# Patient Record
Sex: FEMALE | Race: WHITE | NOT HISPANIC OR LATINO | Employment: FULL TIME | URBAN - METROPOLITAN AREA
[De-identification: names, ages, dates, MRNs, and addresses within clinical notes are randomized per-mention and may not be internally consistent; named-entity substitution may affect disease eponyms.]

---

## 2017-07-27 ENCOUNTER — HOSPITAL ENCOUNTER (INPATIENT)
Facility: HOSPITAL | Age: 32
LOS: 2 days | Discharge: HOME | End: 2017-07-29
Attending: EMERGENCY MEDICINE | Admitting: INTERNAL MEDICINE

## 2017-07-27 ENCOUNTER — APPOINTMENT (OUTPATIENT)
Dept: CT IMAGING | Facility: HOSPITAL | Age: 32
End: 2017-07-27

## 2017-07-27 DIAGNOSIS — A41.9 SEVERE SEPSIS (HCC): ICD-10-CM

## 2017-07-27 DIAGNOSIS — R65.20 SEVERE SEPSIS (HCC): ICD-10-CM

## 2017-07-27 DIAGNOSIS — N12 PYELONEPHRITIS: Primary | ICD-10-CM

## 2017-07-27 PROBLEM — E87.20 LACTIC ACIDOSIS: Status: ACTIVE | Noted: 2017-07-27

## 2017-07-27 PROBLEM — N17.9 AKI (ACUTE KIDNEY INJURY) (HCC): Status: ACTIVE | Noted: 2017-07-27

## 2017-07-27 PROBLEM — N10 ACUTE PYELONEPHRITIS: Status: ACTIVE | Noted: 2017-07-27

## 2017-07-27 LAB
ALBUMIN SERPL-MCNC: 3.4 G/DL (ref 3.5–5.2)
ALBUMIN/GLOB SERPL: 0.8 G/DL
ALP SERPL-CCNC: 93 U/L (ref 39–117)
ALT SERPL W P-5'-P-CCNC: 23 U/L (ref 1–33)
ANION GAP SERPL CALCULATED.3IONS-SCNC: 17.5 MMOL/L
AST SERPL-CCNC: 17 U/L (ref 1–32)
BACTERIA UR QL AUTO: ABNORMAL /HPF
BASOPHILS # BLD AUTO: 0.04 10*3/MM3 (ref 0–0.2)
BASOPHILS NFR BLD AUTO: 0.3 % (ref 0–1.5)
BILIRUB SERPL-MCNC: 0.3 MG/DL (ref 0.1–1.2)
BILIRUB UR QL STRIP: NEGATIVE
BUN BLD-MCNC: 13 MG/DL (ref 6–20)
BUN/CREAT SERPL: 10.2 (ref 7–25)
CALCIUM SPEC-SCNC: 9.4 MG/DL (ref 8.6–10.5)
CHLORIDE SERPL-SCNC: 97 MMOL/L (ref 98–107)
CLARITY UR: ABNORMAL
CO2 SERPL-SCNC: 21.5 MMOL/L (ref 22–29)
COLOR UR: YELLOW
CREAT BLD-MCNC: 1.27 MG/DL (ref 0.57–1)
D-LACTATE SERPL-SCNC: 2.5 MMOL/L (ref 0.5–2)
DEPRECATED RDW RBC AUTO: 45.7 FL (ref 37–54)
EOSINOPHIL # BLD AUTO: 0.21 10*3/MM3 (ref 0–0.7)
EOSINOPHIL NFR BLD AUTO: 1.5 % (ref 0.3–6.2)
ERYTHROCYTE [DISTWIDTH] IN BLOOD BY AUTOMATED COUNT: 12.9 % (ref 11.7–13)
GFR SERPL CREATININE-BSD FRML MDRD: 49 ML/MIN/1.73
GLOBULIN UR ELPH-MCNC: 4.5 GM/DL
GLUCOSE BLD-MCNC: 125 MG/DL (ref 65–99)
GLUCOSE UR STRIP-MCNC: NEGATIVE MG/DL
HCG SERPL QL: NEGATIVE
HCT VFR BLD AUTO: 40 % (ref 35.6–45.5)
HGB BLD-MCNC: 13.4 G/DL (ref 11.9–15.5)
HGB UR QL STRIP.AUTO: ABNORMAL
HOLD SPECIMEN: NORMAL
HYALINE CASTS UR QL AUTO: ABNORMAL /LPF
IMM GRANULOCYTES # BLD: 0.05 10*3/MM3 (ref 0–0.03)
IMM GRANULOCYTES NFR BLD: 0.4 % (ref 0–0.5)
KETONES UR QL STRIP: ABNORMAL
LEUKOCYTE ESTERASE UR QL STRIP.AUTO: ABNORMAL
LYMPHOCYTES # BLD AUTO: 1.03 10*3/MM3 (ref 0.9–4.8)
LYMPHOCYTES NFR BLD AUTO: 7.4 % (ref 19.6–45.3)
MCH RBC QN AUTO: 32.6 PG (ref 26.9–32)
MCHC RBC AUTO-ENTMCNC: 33.5 G/DL (ref 32.4–36.3)
MCV RBC AUTO: 97.3 FL (ref 80.5–98.2)
MONOCYTES # BLD AUTO: 1.07 10*3/MM3 (ref 0.2–1.2)
MONOCYTES NFR BLD AUTO: 7.7 % (ref 5–12)
NEUTROPHILS # BLD AUTO: 11.48 10*3/MM3 (ref 1.9–8.1)
NEUTROPHILS NFR BLD AUTO: 82.7 % (ref 42.7–76)
NITRITE UR QL STRIP: POSITIVE
PH UR STRIP.AUTO: 6 [PH] (ref 5–8)
PLATELET # BLD AUTO: 332 10*3/MM3 (ref 140–500)
PMV BLD AUTO: 10.6 FL (ref 6–12)
POTASSIUM BLD-SCNC: 4.3 MMOL/L (ref 3.5–5.2)
PROT SERPL-MCNC: 7.9 G/DL (ref 6–8.5)
PROT UR QL STRIP: ABNORMAL
RBC # BLD AUTO: 4.11 10*6/MM3 (ref 3.9–5.2)
RBC # UR: ABNORMAL /HPF
REF LAB TEST METHOD: ABNORMAL
SODIUM BLD-SCNC: 136 MMOL/L (ref 136–145)
SP GR UR STRIP: 1.01 (ref 1–1.03)
SQUAMOUS #/AREA URNS HPF: ABNORMAL /HPF
UROBILINOGEN UR QL STRIP: ABNORMAL
WBC NRBC COR # BLD: 13.88 10*3/MM3 (ref 4.5–10.7)
WBC UR QL AUTO: ABNORMAL /HPF
WHOLE BLOOD HOLD SPECIMEN: NORMAL
WHOLE BLOOD HOLD SPECIMEN: NORMAL

## 2017-07-27 PROCEDURE — 84703 CHORIONIC GONADOTROPIN ASSAY: CPT

## 2017-07-27 PROCEDURE — 87040 BLOOD CULTURE FOR BACTERIA: CPT

## 2017-07-27 PROCEDURE — 80053 COMPREHEN METABOLIC PANEL: CPT

## 2017-07-27 PROCEDURE — 74177 CT ABD & PELVIS W/CONTRAST: CPT

## 2017-07-27 PROCEDURE — 87086 URINE CULTURE/COLONY COUNT: CPT | Performed by: EMERGENCY MEDICINE

## 2017-07-27 PROCEDURE — 87186 SC STD MICRODIL/AGAR DIL: CPT | Performed by: EMERGENCY MEDICINE

## 2017-07-27 PROCEDURE — 81001 URINALYSIS AUTO W/SCOPE: CPT | Performed by: EMERGENCY MEDICINE

## 2017-07-27 PROCEDURE — 85025 COMPLETE CBC W/AUTO DIFF WBC: CPT

## 2017-07-27 PROCEDURE — 87040 BLOOD CULTURE FOR BACTERIA: CPT | Performed by: EMERGENCY MEDICINE

## 2017-07-27 PROCEDURE — 25010000002 CEFEPIME: Performed by: EMERGENCY MEDICINE

## 2017-07-27 PROCEDURE — 83605 ASSAY OF LACTIC ACID: CPT | Performed by: EMERGENCY MEDICINE

## 2017-07-27 PROCEDURE — 99284 EMERGENCY DEPT VISIT MOD MDM: CPT

## 2017-07-27 PROCEDURE — 36415 COLL VENOUS BLD VENIPUNCTURE: CPT

## 2017-07-27 PROCEDURE — 0 IOPAMIDOL 61 % SOLUTION: Performed by: EMERGENCY MEDICINE

## 2017-07-27 RX ORDER — SODIUM CHLORIDE 9 MG/ML
75 INJECTION, SOLUTION INTRAVENOUS CONTINUOUS
Status: DISCONTINUED | OUTPATIENT
Start: 2017-07-27 | End: 2017-07-29 | Stop reason: HOSPADM

## 2017-07-27 RX ORDER — ACETAMINOPHEN 325 MG/1
975 TABLET ORAL ONCE
Status: DISCONTINUED | OUTPATIENT
Start: 2017-07-27 | End: 2017-07-29 | Stop reason: HOSPADM

## 2017-07-27 RX ORDER — SULFAMETHOXAZOLE AND TRIMETHOPRIM 800; 160 MG/1; MG/1
1 TABLET ORAL 2 TIMES DAILY
COMMUNITY
Start: 2017-07-24 | End: 2017-07-29 | Stop reason: HOSPADM

## 2017-07-27 RX ORDER — SODIUM CHLORIDE 0.9 % (FLUSH) 0.9 %
10 SYRINGE (ML) INJECTION AS NEEDED
Status: DISCONTINUED | OUTPATIENT
Start: 2017-07-27 | End: 2017-07-29 | Stop reason: HOSPADM

## 2017-07-27 RX ORDER — ONDANSETRON 2 MG/ML
4 INJECTION INTRAMUSCULAR; INTRAVENOUS EVERY 6 HOURS PRN
Status: DISCONTINUED | OUTPATIENT
Start: 2017-07-27 | End: 2017-07-29 | Stop reason: HOSPADM

## 2017-07-27 RX ORDER — CEFTRIAXONE SODIUM 1 G/50ML
1 INJECTION, SOLUTION INTRAVENOUS EVERY 24 HOURS
Status: DISCONTINUED | OUTPATIENT
Start: 2017-07-28 | End: 2017-07-28

## 2017-07-27 RX ORDER — ACETAMINOPHEN 325 MG/1
650 TABLET ORAL EVERY 4 HOURS PRN
Status: DISCONTINUED | OUTPATIENT
Start: 2017-07-27 | End: 2017-07-29 | Stop reason: HOSPADM

## 2017-07-27 RX ORDER — SODIUM CHLORIDE 0.9 % (FLUSH) 0.9 %
1-10 SYRINGE (ML) INJECTION AS NEEDED
Status: DISCONTINUED | OUTPATIENT
Start: 2017-07-27 | End: 2017-07-29 | Stop reason: HOSPADM

## 2017-07-27 RX ORDER — ACETAMINOPHEN 500 MG
1000 TABLET ORAL ONCE
Status: COMPLETED | OUTPATIENT
Start: 2017-07-27 | End: 2017-07-27

## 2017-07-27 RX ORDER — PAROXETINE HYDROCHLORIDE 40 MG/1
40 TABLET, FILM COATED ORAL EVERY MORNING
COMMUNITY
End: 2017-08-29 | Stop reason: HOSPADM

## 2017-07-27 RX ADMIN — SODIUM CHLORIDE 1770 ML: 9 INJECTION, SOLUTION INTRAVENOUS at 20:30

## 2017-07-27 RX ADMIN — IOPAMIDOL 85 ML: 612 INJECTION, SOLUTION INTRAVENOUS at 21:43

## 2017-07-27 RX ADMIN — ACETAMINOPHEN 1000 MG: 500 TABLET ORAL at 19:43

## 2017-07-27 RX ADMIN — CEFEPIME 2 G: 2 INJECTION, POWDER, FOR SOLUTION INTRAVENOUS at 20:49

## 2017-07-28 LAB
ANION GAP SERPL CALCULATED.3IONS-SCNC: 15.3 MMOL/L
BASOPHILS # BLD AUTO: 0.07 10*3/MM3 (ref 0–0.2)
BASOPHILS NFR BLD AUTO: 0.5 % (ref 0–1.5)
BUN BLD-MCNC: 11 MG/DL (ref 6–20)
BUN/CREAT SERPL: 9.2 (ref 7–25)
CALCIUM SPEC-SCNC: 7.8 MG/DL (ref 8.6–10.5)
CHLORIDE SERPL-SCNC: 105 MMOL/L (ref 98–107)
CO2 SERPL-SCNC: 15.7 MMOL/L (ref 22–29)
CREAT BLD-MCNC: 1.19 MG/DL (ref 0.57–1)
D-LACTATE SERPL-SCNC: 0.9 MMOL/L (ref 0.5–2)
DEPRECATED RDW RBC AUTO: 46 FL (ref 37–54)
EOSINOPHIL # BLD AUTO: 0.31 10*3/MM3 (ref 0–0.7)
EOSINOPHIL NFR BLD AUTO: 2.3 % (ref 0.3–6.2)
ERYTHROCYTE [DISTWIDTH] IN BLOOD BY AUTOMATED COUNT: 13.1 % (ref 11.7–13)
GFR SERPL CREATININE-BSD FRML MDRD: 53 ML/MIN/1.73
GLUCOSE BLD-MCNC: 109 MG/DL (ref 65–99)
HCT VFR BLD AUTO: 38.3 % (ref 35.6–45.5)
HGB BLD-MCNC: 12.2 G/DL (ref 11.9–15.5)
IMM GRANULOCYTES # BLD: 0.06 10*3/MM3 (ref 0–0.03)
IMM GRANULOCYTES NFR BLD: 0.5 % (ref 0–0.5)
LYMPHOCYTES # BLD AUTO: 1.67 10*3/MM3 (ref 0.9–4.8)
LYMPHOCYTES NFR BLD AUTO: 12.6 % (ref 19.6–45.3)
MCH RBC QN AUTO: 30.8 PG (ref 26.9–32)
MCHC RBC AUTO-ENTMCNC: 31.9 G/DL (ref 32.4–36.3)
MCV RBC AUTO: 96.7 FL (ref 80.5–98.2)
MONOCYTES # BLD AUTO: 1.7 10*3/MM3 (ref 0.2–1.2)
MONOCYTES NFR BLD AUTO: 12.8 % (ref 5–12)
NEUTROPHILS # BLD AUTO: 9.47 10*3/MM3 (ref 1.9–8.1)
NEUTROPHILS NFR BLD AUTO: 71.3 % (ref 42.7–76)
NRBC BLD MANUAL-RTO: 0 /100 WBC (ref 0–0)
PLATELET # BLD AUTO: 237 10*3/MM3 (ref 140–500)
PMV BLD AUTO: 11.1 FL (ref 6–12)
POTASSIUM BLD-SCNC: 4.4 MMOL/L (ref 3.5–5.2)
RBC # BLD AUTO: 3.96 10*6/MM3 (ref 3.9–5.2)
SODIUM BLD-SCNC: 136 MMOL/L (ref 136–145)
WBC NRBC COR # BLD: 13.28 10*3/MM3 (ref 4.5–10.7)

## 2017-07-28 PROCEDURE — 83605 ASSAY OF LACTIC ACID: CPT | Performed by: EMERGENCY MEDICINE

## 2017-07-28 PROCEDURE — 25010000002 ONDANSETRON PER 1 MG: Performed by: INTERNAL MEDICINE

## 2017-07-28 PROCEDURE — 25010000002 CEFTRIAXONE PER 250 MG: Performed by: INTERNAL MEDICINE

## 2017-07-28 PROCEDURE — 85025 COMPLETE CBC W/AUTO DIFF WBC: CPT | Performed by: INTERNAL MEDICINE

## 2017-07-28 PROCEDURE — 80048 BASIC METABOLIC PNL TOTAL CA: CPT | Performed by: INTERNAL MEDICINE

## 2017-07-28 RX ORDER — CEFTRIAXONE SODIUM 2 G/50ML
2 INJECTION, SOLUTION INTRAVENOUS EVERY 24 HOURS
Status: DISCONTINUED | OUTPATIENT
Start: 2017-07-29 | End: 2017-07-29 | Stop reason: HOSPADM

## 2017-07-28 RX ADMIN — ONDANSETRON 4 MG: 2 INJECTION INTRAMUSCULAR; INTRAVENOUS at 05:44

## 2017-07-28 RX ADMIN — ACETAMINOPHEN 650 MG: 325 TABLET ORAL at 19:44

## 2017-07-28 RX ADMIN — CEFTRIAXONE SODIUM 1 G: 1 INJECTION, SOLUTION INTRAVENOUS at 05:44

## 2017-07-28 RX ADMIN — ACETAMINOPHEN 650 MG: 325 TABLET ORAL at 07:25

## 2017-07-28 RX ADMIN — SODIUM CHLORIDE 125 ML/HR: 9 INJECTION, SOLUTION INTRAVENOUS at 00:16

## 2017-07-28 RX ADMIN — SODIUM CHLORIDE 125 ML/HR: 9 INJECTION, SOLUTION INTRAVENOUS at 08:36

## 2017-07-28 RX ADMIN — SODIUM CHLORIDE 125 ML/HR: 9 INJECTION, SOLUTION INTRAVENOUS at 16:50

## 2017-07-29 VITALS
HEART RATE: 86 BPM | WEIGHT: 130 LBS | BODY MASS INDEX: 21.66 KG/M2 | SYSTOLIC BLOOD PRESSURE: 113 MMHG | HEIGHT: 65 IN | TEMPERATURE: 98.9 F | DIASTOLIC BLOOD PRESSURE: 64 MMHG | OXYGEN SATURATION: 97 % | RESPIRATION RATE: 16 BRPM

## 2017-07-29 PROBLEM — A41.51 ESCHERICHIA COLI SEPSIS (HCC): Status: ACTIVE | Noted: 2017-07-29

## 2017-07-29 PROBLEM — N17.9 AKI (ACUTE KIDNEY INJURY) (HCC): Status: RESOLVED | Noted: 2017-07-27 | Resolved: 2017-07-29

## 2017-07-29 PROBLEM — A41.9 SEPSIS (HCC): Status: RESOLVED | Noted: 2017-07-27 | Resolved: 2017-07-29

## 2017-07-29 PROBLEM — E87.20 LACTIC ACIDOSIS: Status: RESOLVED | Noted: 2017-07-27 | Resolved: 2017-07-29

## 2017-07-29 LAB
ANION GAP SERPL CALCULATED.3IONS-SCNC: 10 MMOL/L
BACTERIA SPEC AEROBE CULT: ABNORMAL
BUN BLD-MCNC: 6 MG/DL (ref 6–20)
BUN/CREAT SERPL: 6.7 (ref 7–25)
CALCIUM SPEC-SCNC: 8.4 MG/DL (ref 8.6–10.5)
CHLORIDE SERPL-SCNC: 111 MMOL/L (ref 98–107)
CO2 SERPL-SCNC: 21 MMOL/L (ref 22–29)
CREAT BLD-MCNC: 0.9 MG/DL (ref 0.57–1)
GFR SERPL CREATININE-BSD FRML MDRD: 74 ML/MIN/1.73
GLUCOSE BLD-MCNC: 103 MG/DL (ref 65–99)
HBA1C MFR BLD: 5.2 % (ref 4.8–5.6)
POTASSIUM BLD-SCNC: 4.2 MMOL/L (ref 3.5–5.2)
SODIUM BLD-SCNC: 142 MMOL/L (ref 136–145)

## 2017-07-29 PROCEDURE — 25010000003 CEFTRIAXONE PER 250 MG: Performed by: INTERNAL MEDICINE

## 2017-07-29 PROCEDURE — 83036 HEMOGLOBIN GLYCOSYLATED A1C: CPT | Performed by: INTERNAL MEDICINE

## 2017-07-29 PROCEDURE — 80048 BASIC METABOLIC PNL TOTAL CA: CPT | Performed by: INTERNAL MEDICINE

## 2017-07-29 RX ORDER — ACETAMINOPHEN 325 MG/1
650 TABLET ORAL EVERY 6 HOURS PRN
Refills: 0
Start: 2017-07-29 | End: 2017-08-29 | Stop reason: HOSPADM

## 2017-07-29 RX ORDER — CEPHALEXIN 500 MG/1
500 CAPSULE ORAL 3 TIMES DAILY
Qty: 33 CAPSULE | Refills: 0 | Status: ON HOLD | OUTPATIENT
Start: 2017-07-30 | End: 2017-08-18

## 2017-07-29 RX ADMIN — SODIUM CHLORIDE 75 ML/HR: 9 INJECTION, SOLUTION INTRAVENOUS at 04:05

## 2017-07-29 RX ADMIN — CEFTRIAXONE SODIUM 2 G: 2 INJECTION, SOLUTION INTRAVENOUS at 05:23

## 2017-08-01 LAB
BACTERIA SPEC AEROBE CULT: NORMAL
BACTERIA SPEC AEROBE CULT: NORMAL

## 2017-08-17 ENCOUNTER — HOSPITAL ENCOUNTER (EMERGENCY)
Facility: HOSPITAL | Age: 32
Discharge: PSYCHIATRIC OR SUBSTANCE USE FACILITY | End: 2017-08-17
Attending: EMERGENCY MEDICINE | Admitting: EMERGENCY MEDICINE

## 2017-08-17 ENCOUNTER — HOSPITAL ENCOUNTER (INPATIENT)
Facility: HOSPITAL | Age: 32
LOS: 12 days | Discharge: HOME | End: 2017-08-29
Attending: SPECIALIST | Admitting: SPECIALIST

## 2017-08-17 VITALS
TEMPERATURE: 98.7 F | RESPIRATION RATE: 16 BRPM | OXYGEN SATURATION: 100 % | DIASTOLIC BLOOD PRESSURE: 99 MMHG | SYSTOLIC BLOOD PRESSURE: 137 MMHG | WEIGHT: 125 LBS | HEIGHT: 66 IN | HEART RATE: 79 BPM | BODY MASS INDEX: 20.09 KG/M2

## 2017-08-17 DIAGNOSIS — F30.9 MANIA (HCC): Primary | ICD-10-CM

## 2017-08-17 PROBLEM — F31.64 BIPOLAR DISORDER, CURR EPISODE MIXED, SEVERE, WITH PSYCHOTIC FEATURES: Status: ACTIVE | Noted: 2017-08-17

## 2017-08-17 LAB
ALBUMIN SERPL-MCNC: 4.8 G/DL (ref 3.5–5.2)
ALBUMIN/GLOB SERPL: 1.3 G/DL
ALP SERPL-CCNC: 62 U/L (ref 39–117)
ALT SERPL W P-5'-P-CCNC: 15 U/L (ref 1–33)
AMPHET+METHAMPHET UR QL: NEGATIVE
ANION GAP SERPL CALCULATED.3IONS-SCNC: 14.5 MMOL/L
ANION GAP SERPL CALCULATED.3IONS-SCNC: 15.9 MMOL/L
APAP SERPL-MCNC: <5 MCG/ML (ref 10–30)
AST SERPL-CCNC: 22 U/L (ref 1–32)
BACTERIA UR QL AUTO: ABNORMAL /HPF
BARBITURATES UR QL SCN: NEGATIVE
BASOPHILS # BLD AUTO: 0.14 10*3/MM3 (ref 0–0.2)
BASOPHILS NFR BLD AUTO: 1.7 % (ref 0–1.5)
BENZODIAZ UR QL SCN: NEGATIVE
BILIRUB SERPL-MCNC: 0.7 MG/DL (ref 0.1–1.2)
BILIRUB UR QL STRIP: NEGATIVE
BUN BLD-MCNC: 9 MG/DL (ref 6–20)
BUN BLD-MCNC: 9 MG/DL (ref 6–20)
BUN/CREAT SERPL: 10.1 (ref 7–25)
BUN/CREAT SERPL: 9.3 (ref 7–25)
CALCIUM SPEC-SCNC: 9.3 MG/DL (ref 8.6–10.5)
CALCIUM SPEC-SCNC: 9.6 MG/DL (ref 8.6–10.5)
CANNABINOIDS SERPL QL: POSITIVE
CHLORIDE SERPL-SCNC: 103 MMOL/L (ref 98–107)
CHLORIDE SERPL-SCNC: 103 MMOL/L (ref 98–107)
CLARITY UR: CLEAR
CO2 SERPL-SCNC: 21.5 MMOL/L (ref 22–29)
CO2 SERPL-SCNC: 22.1 MMOL/L (ref 22–29)
COCAINE UR QL: NEGATIVE
COLOR UR: YELLOW
CREAT BLD-MCNC: 0.89 MG/DL (ref 0.57–1)
CREAT BLD-MCNC: 0.97 MG/DL (ref 0.57–1)
DEPRECATED RDW RBC AUTO: 49.2 FL (ref 37–54)
EOSINOPHIL # BLD AUTO: 0.39 10*3/MM3 (ref 0–0.7)
EOSINOPHIL NFR BLD AUTO: 4.9 % (ref 0.3–6.2)
ERYTHROCYTE [DISTWIDTH] IN BLOOD BY AUTOMATED COUNT: 13.7 % (ref 11.7–13)
ETHANOL BLD-MCNC: <10 MG/DL (ref 0–10)
ETHANOL UR QL: <0.01 %
GFR SERPL CREATININE-BSD FRML MDRD: 67 ML/MIN/1.73
GFR SERPL CREATININE-BSD FRML MDRD: 74 ML/MIN/1.73
GLOBULIN UR ELPH-MCNC: 3.7 GM/DL
GLUCOSE BLD-MCNC: 89 MG/DL (ref 65–99)
GLUCOSE BLD-MCNC: 91 MG/DL (ref 65–99)
GLUCOSE UR STRIP-MCNC: NEGATIVE MG/DL
HCG SERPL QL: NEGATIVE
HCT VFR BLD AUTO: 41.1 % (ref 35.6–45.5)
HGB BLD-MCNC: 13.5 G/DL (ref 11.9–15.5)
HGB UR QL STRIP.AUTO: ABNORMAL
HYALINE CASTS UR QL AUTO: ABNORMAL /LPF
IMM GRANULOCYTES # BLD: 0 10*3/MM3 (ref 0–0.03)
IMM GRANULOCYTES NFR BLD: 0 % (ref 0–0.5)
KETONES UR QL STRIP: ABNORMAL
LEUKOCYTE ESTERASE UR QL STRIP.AUTO: NEGATIVE
LYMPHOCYTES # BLD AUTO: 2.19 10*3/MM3 (ref 0.9–4.8)
LYMPHOCYTES NFR BLD AUTO: 27.3 % (ref 19.6–45.3)
MCH RBC QN AUTO: 32.3 PG (ref 26.9–32)
MCHC RBC AUTO-ENTMCNC: 32.8 G/DL (ref 32.4–36.3)
MCV RBC AUTO: 98.3 FL (ref 80.5–98.2)
METHADONE UR QL SCN: NEGATIVE
MONOCYTES # BLD AUTO: 0.56 10*3/MM3 (ref 0.2–1.2)
MONOCYTES NFR BLD AUTO: 7 % (ref 5–12)
NEUTROPHILS # BLD AUTO: 4.74 10*3/MM3 (ref 1.9–8.1)
NEUTROPHILS NFR BLD AUTO: 59.1 % (ref 42.7–76)
NITRITE UR QL STRIP: NEGATIVE
OPIATES UR QL: NEGATIVE
OXYCODONE UR QL SCN: NEGATIVE
PH UR STRIP.AUTO: 6 [PH] (ref 5–8)
PLATELET # BLD AUTO: 331 10*3/MM3 (ref 140–500)
PMV BLD AUTO: 10.3 FL (ref 6–12)
POTASSIUM BLD-SCNC: 2.9 MMOL/L (ref 3.5–5.2)
POTASSIUM BLD-SCNC: 4.7 MMOL/L (ref 3.5–5.2)
PROT SERPL-MCNC: 8.5 G/DL (ref 6–8.5)
PROT UR QL STRIP: NEGATIVE
RBC # BLD AUTO: 4.18 10*6/MM3 (ref 3.9–5.2)
RBC # UR: ABNORMAL /HPF
REF LAB TEST METHOD: ABNORMAL
SALICYLATES SERPL-MCNC: <0.3 MG/DL
SODIUM BLD-SCNC: 139 MMOL/L (ref 136–145)
SODIUM BLD-SCNC: 141 MMOL/L (ref 136–145)
SP GR UR STRIP: 1.01 (ref 1–1.03)
SQUAMOUS #/AREA URNS HPF: ABNORMAL /HPF
T4 FREE SERPL-MCNC: 1.93 NG/DL (ref 0.93–1.7)
TSH SERPL DL<=0.05 MIU/L-ACNC: 2.44 MIU/ML (ref 0.27–4.2)
UROBILINOGEN UR QL STRIP: ABNORMAL
WBC NRBC COR # BLD: 8.02 10*3/MM3 (ref 4.5–10.7)
WBC UR QL AUTO: ABNORMAL /HPF

## 2017-08-17 PROCEDURE — 80307 DRUG TEST PRSMV CHEM ANLYZR: CPT | Performed by: EMERGENCY MEDICINE

## 2017-08-17 PROCEDURE — 84443 ASSAY THYROID STIM HORMONE: CPT | Performed by: SPECIALIST

## 2017-08-17 PROCEDURE — 80048 BASIC METABOLIC PNL TOTAL CA: CPT | Performed by: INTERNAL MEDICINE

## 2017-08-17 PROCEDURE — 36415 COLL VENOUS BLD VENIPUNCTURE: CPT

## 2017-08-17 PROCEDURE — 81003 URINALYSIS AUTO W/O SCOPE: CPT | Performed by: EMERGENCY MEDICINE

## 2017-08-17 PROCEDURE — 85025 COMPLETE CBC W/AUTO DIFF WBC: CPT | Performed by: EMERGENCY MEDICINE

## 2017-08-17 PROCEDURE — 81001 URINALYSIS AUTO W/SCOPE: CPT | Performed by: EMERGENCY MEDICINE

## 2017-08-17 PROCEDURE — 84703 CHORIONIC GONADOTROPIN ASSAY: CPT | Performed by: EMERGENCY MEDICINE

## 2017-08-17 PROCEDURE — 99284 EMERGENCY DEPT VISIT MOD MDM: CPT

## 2017-08-17 PROCEDURE — 84439 ASSAY OF FREE THYROXINE: CPT | Performed by: SPECIALIST

## 2017-08-17 PROCEDURE — 80053 COMPREHEN METABOLIC PANEL: CPT | Performed by: EMERGENCY MEDICINE

## 2017-08-17 RX ORDER — OLANZAPINE 7.5 MG/1
15 TABLET ORAL NIGHTLY
Status: COMPLETED | OUTPATIENT
Start: 2017-08-17 | End: 2017-08-17

## 2017-08-17 RX ORDER — ACETAMINOPHEN 325 MG/1
650 TABLET ORAL EVERY 4 HOURS PRN
Status: DISCONTINUED | OUTPATIENT
Start: 2017-08-17 | End: 2017-08-29 | Stop reason: HOSPADM

## 2017-08-17 RX ORDER — OLANZAPINE 10 MG/1
10 INJECTION, POWDER, LYOPHILIZED, FOR SOLUTION INTRAMUSCULAR EVERY 8 HOURS PRN
Status: ACTIVE | OUTPATIENT
Start: 2017-08-17 | End: 2017-08-20

## 2017-08-17 RX ORDER — ALUMINA, MAGNESIA, AND SIMETHICONE 2400; 2400; 240 MG/30ML; MG/30ML; MG/30ML
15 SUSPENSION ORAL EVERY 6 HOURS PRN
Status: DISCONTINUED | OUTPATIENT
Start: 2017-08-17 | End: 2017-08-29 | Stop reason: HOSPADM

## 2017-08-17 RX ORDER — POTASSIUM CHLORIDE 750 MG/1
40 CAPSULE, EXTENDED RELEASE ORAL ONCE
Status: COMPLETED | OUTPATIENT
Start: 2017-08-17 | End: 2017-08-17

## 2017-08-17 RX ORDER — POTASSIUM CHLORIDE 1.5 G/1.77G
40 POWDER, FOR SOLUTION ORAL AS NEEDED
Status: DISCONTINUED | OUTPATIENT
Start: 2017-08-17 | End: 2017-08-29 | Stop reason: HOSPADM

## 2017-08-17 RX ORDER — POTASSIUM CHLORIDE 750 MG/1
40 CAPSULE, EXTENDED RELEASE ORAL AS NEEDED
Status: DISCONTINUED | OUTPATIENT
Start: 2017-08-17 | End: 2017-08-29 | Stop reason: HOSPADM

## 2017-08-17 RX ADMIN — POTASSIUM CHLORIDE 40 MEQ: 750 CAPSULE, EXTENDED RELEASE ORAL at 15:38

## 2017-08-17 RX ADMIN — OLANZAPINE 15 MG: 7.5 TABLET, FILM COATED ORAL at 21:45
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
## 2017-08-17 NOTE — ED NOTES
I provided the patient with a turkey sandwich, potato chips, and water at the request of Dr. Kelley.      Zoraida Bonilla  08/17/17 5658

## 2017-08-17 NOTE — ED PROVIDER NOTES
EMERGENCY DEPARTMENT ENCOUNTER    CHIEF COMPLAINT  Chief Complaint: mental status change  History given by: Pt's family  History limited by: mental status change  Room Number: 26/26  PMD: ROWDY Black      HPI:  Pt is a 31 y.o. female who presents with mother complaining of mental status change that began several weeks ago. Pt withdrew from [facility] 7/14/2017 due to a 2 week vacation to [place]. Pt's friend states she has been acting erratic, forgetting things, overly happy, and friends have contacted him and the family about the same issues. Pt has been posting a lot of random things on social media. Pt's mother states 8 years ago she had similar symptoms before when she stopped taking Lexapro, and was diagnosed with depression and bipolar episodes. Pt denies SI.    Duration:  Several weeks  Onset: gradual   Timing: constant  Quality: altered mental status  Intensity/Severity: moderate  Progression: unchanged  Associated Symptoms: none  Aggravating Factors: none  Alleviating Factors: none  Previous Episodes: Pt has a history of depression and has experienced similar symptoms in the past before after she stopped taking Lexapro.  Treatment before arrival: none    PAST MEDICAL HISTORY  Active Ambulatory Problems     Diagnosis Date Noted   • Acute pyelonephritis 07/27/2017   • Escherichia coli sepsis 07/29/2017     Resolved Ambulatory Problems     Diagnosis Date Noted   • Sepsis 07/27/2017   • DONNY (acute kidney injury) 07/27/2017   • Lactic acidosis 07/27/2017     Past Medical History:   Diagnosis Date   • Depression    • UTI (urinary tract infection)        PAST SURGICAL HISTORY  History reviewed. No pertinent surgical history.    FAMILY HISTORY  History reviewed. No pertinent family history.    SOCIAL HISTORY  Social History     Social History   • Marital status: Single     Spouse name: N/A   • Number of children: N/A   • Years of education: N/A     Occupational History   • Not on file.     Social History  Main Topics   • Smoking status: Never Smoker   • Smokeless tobacco: Not on file   • Alcohol use No   • Drug use: No   • Sexual activity: Defer     Other Topics Concern   • Not on file     Social History Narrative       ALLERGIES  Review of patient's allergies indicates no known allergies.    REVIEW OF SYSTEMS  Review of Systems   Constitutional: Negative for chills and fever.   Respiratory: Negative for cough and shortness of breath.    Cardiovascular: Negative for chest pain and palpitations.   Gastrointestinal: Negative for abdominal pain, nausea and vomiting.   Genitourinary: Negative for dysuria.   Musculoskeletal: Negative for back pain.   Neurological: Negative for syncope, weakness and numbness.   Psychiatric/Behavioral: Positive for behavioral problems (pt has being acting erratic and out of normal baseline ). Negative for suicidal ideas.   All other systems reviewed and are negative.      PHYSICAL EXAM  ED Triage Vitals   Temp Heart Rate Resp BP SpO2   08/17/17 1105 08/17/17 1105 08/17/17 1105 08/17/17 1105 08/17/17 1105   99.3 °F (37.4 °C) 99 18 143/105 100 %      Temp src Heart Rate Source Patient Position BP Location FiO2 (%)   08/17/17 1105 08/17/17 1105 -- -- --   Tympanic Monitor          Physical Exam   Constitutional: She is oriented to person, place, and time and well-developed, well-nourished, and in no distress. No distress.   Pt is in no acute distress and no obvious trauma.    HENT:   Head: Normocephalic and atraumatic.   Eyes: EOM are normal. Pupils are equal, round, and reactive to light.   Neck: Normal range of motion. Neck supple.   Cardiovascular: Normal rate, regular rhythm and normal heart sounds.    Pulmonary/Chest: Effort normal and breath sounds normal. No respiratory distress.   Abdominal: Soft. There is no tenderness. There is no rebound and no guarding.   Musculoskeletal: Normal range of motion. She exhibits no edema.   Neurological: She is alert and oriented to person, place, and  time. She has normal sensation and normal strength.   The pt has circumferential and tangential speech. The pt also has pressured speech. The pt is religiously preoccupied.   Skin: Skin is warm and dry. No rash noted.   Nursing note and vitals reviewed.      LAB RESULTS  Lab Results (last 24 hours)     Procedure Component Value Units Date/Time    Urine Drug Screen [[id number]]  (Abnormal) Collected:  08/17/17 1412    Specimen:  Urine from Urine, Clean Catch Updated:  08/17/17 1500     Amphet/Methamphet, Screen Negative     Barbiturates Screen, Urine Negative     Benzodiazepine Screen, Urine Negative     Cocaine Screen, Urine Negative     Opiate Screen Negative     THC, Screen, Urine Positive (A)     Methadone Screen, Urine Negative     Oxycodone Screen, Urine Negative    Narrative:       Negative Thresholds For Drugs Screened:     Amphetamines               500 ng/ml   Barbiturates               200 ng/ml   Benzodiazepines            100 ng/ml   Cocaine                    300 ng/ml   Methadone                  300 ng/ml   Opiates                    300 ng/ml   Oxycodone                  100 ng/ml   THC                        50 ng/ml    The Normal Value for all drugs tested is negative. This report includes final unconfirmed screening results to be used for medical treatment purposes only. Unconfirmed results must not be used for non-medical purposes such as employment or legal testing. Clinical consideration should be applied to any drug of abuse test, particulary when unconfirmed results are used.    Urinalysis With / Culture If Indicated [[id number]]  (Abnormal) Collected:  08/17/17 1412    Specimen:  Urine from Urine, Clean Catch Updated:  08/17/17 1521     Color, UA Yellow     Appearance, UA Clear     pH, UA 6.0     Specific Gravity, UA 1.010     Glucose, UA Negative     Ketones, UA 15 mg/dL (1+) (A)     Bilirubin, UA Negative     Blood, UA Trace (A)     Protein, UA Negative     Leuk Esterase, UA Negative      Nitrite, UA Negative     Urobilinogen, UA 0.2 E.U./dL    Urinalysis, Microscopic Only [[id number]]  (Abnormal) Collected:  08/17/17 1412    Specimen:  Urine from Urine, Clean Catch Updated:  08/17/17 1523     RBC, UA 0-2 /HPF      WBC, UA 3-5 (A) /HPF      Bacteria, UA 1+ (A) /HPF      Squamous Epithelial Cells, UA 3-6 (A) /HPF      Hyaline Casts, UA 3-6 /LPF      Methodology Automated Microscopy    CBC & Differential [[id number]] Collected:  08/17/17 1423    Specimen:  Blood Updated:  08/17/17 1434    Narrative:       The following orders were created for panel order CBC & Differential.  Procedure                               Abnormality         Status                     ---------                               -----------         ------                     CBC Auto Differential[[id number]]        Abnormal            Final result                 Please view results for these tests on the individual orders.    Comprehensive Metabolic Panel [[id number]]  (Abnormal) Collected:  08/17/17 1423    Specimen:  Blood Updated:  08/17/17 1504     Glucose 91 mg/dL      BUN 9 mg/dL      Creatinine 0.97 mg/dL      Sodium 141 mmol/L      Potassium 2.9 (L) mmol/L      Chloride 103 mmol/L      CO2 22.1 mmol/L      Calcium 9.6 mg/dL      Total Protein 8.5 g/dL      Albumin 4.80 g/dL      ALT (SGPT) 15 U/L      AST (SGOT) 22 U/L      Alkaline Phosphatase 62 U/L      Total Bilirubin 0.7 mg/dL      eGFR Non African Amer 67 mL/min/1.73      Globulin 3.7 gm/dL      A/G Ratio 1.3 g/dL      BUN/Creatinine Ratio 9.3     Anion Gap 15.9 mmol/L     hCG, Serum, Qualitative [[id number]]  (Normal) Collected:  08/17/17 1423    Specimen:  Blood Updated:  08/17/17 1508     HCG Qualitative Negative    Ethanol [[id number]] Collected:  08/17/17 1423    Specimen:  Blood Updated:  08/17/17 1458     Ethanol <10 mg/dL      Ethanol % <0.010 %     Acetaminophen Level [[id number]]  (Abnormal) Collected:  08/17/17 1423    Specimen:  Blood Updated:  08/17/17 1453      Acetaminophen <5.0 (L) mcg/mL     Salicylate Level [[id number]] Collected:  08/17/17 1423    Specimen:  Blood Updated:  08/17/17 1458     Salicylate <0.3 mg/dL     Narrative:       Therapeutic range for Salicylates:  3.0 - 10.0 mg/dL for antipyretic/analgesic conditions  15.0 - 30.0 mg/dL for anti-inflammatory conditions    CBC Auto Differential [[id number]]  (Abnormal) Collected:  08/17/17 1423    Specimen:  Blood Updated:  08/17/17 1434     WBC 8.02 10*3/mm3      RBC 4.18 10*6/mm3      Hemoglobin 13.5 g/dL      Hematocrit 41.1 %      MCV 98.3 (H) fL      MCH 32.3 (H) pg      MCHC 32.8 g/dL      RDW 13.7 (H) %      RDW-SD 49.2 fl      MPV 10.3 fL      Platelets 331 10*3/mm3      Neutrophil % 59.1 %      Lymphocyte % 27.3 %      Monocyte % 7.0 %      Eosinophil % 4.9 %      Basophil % 1.7 (H) %      Immature Grans % 0.0 %      Neutrophils, Absolute 4.74 10*3/mm3      Lymphocytes, Absolute 2.19 10*3/mm3      Monocytes, Absolute 0.56 10*3/mm3      Eosinophils, Absolute 0.39 10*3/mm3      Basophils, Absolute 0.14 10*3/mm3      Immature Grans, Absolute 0.00 10*3/mm3           I ordered the above labs and reviewed the results      PROCEDURES  Procedures      PROGRESS AND CONSULTS  ED Course     1303   Ordered labs for further evaluation and consult to ACCESS.     1357   Ordered UA with cultures and urine drug screen for further evaluation.     1511  Pt will be admitted to psychiatric for alee, and will be admitted for further psychiatric care. Given 40 mEq of potassium to treat hypokalemia.       MEDICAL DECISION MAKING  Results were reviewed/discussed with the patient and they were also made aware of online access. Pt also made aware that some labs, such as cultures, will not be resulted during ER visit and follow up with PMD is necessary.     MDM  Number of Diagnoses or Management Options  Alee:      Amount and/or Complexity of Data Reviewed  Clinical lab tests: ordered and reviewed (Potassium -2.9)  Independent  visualization of images, tracings, or specimens: yes    Patient Progress  Patient progress: stable         DIAGNOSIS  Final diagnoses:   Alee       DISPOSITION  ADMISSION    Discussed treatment plan and reason for admission with pt/family and admitting physician.  Pt/family voiced understanding of the plan for admission for further testing/treatment as needed.         Latest Documented Vital Signs:  As of 4:52 PM  BP- 137/99 HR- 79 Temp- 98.7 °F (37.1 °C) (Oral) O2 sat- 100%    --  Documentation assistance provided by maykel Wheeler for Dr. Madan Kelley MD.  Information recorded by the scribe was done at my direction and has been verified and validated by me.     Turner Resendiz  08/17/17 0990       Madan Kelley MD  08/17/17 1404

## 2017-08-18 LAB
ANION GAP SERPL CALCULATED.3IONS-SCNC: 9.3 MMOL/L
BUN BLD-MCNC: 7 MG/DL (ref 6–20)
BUN/CREAT SERPL: 6.8 (ref 7–25)
CALCIUM SPEC-SCNC: 8.7 MG/DL (ref 8.6–10.5)
CHLORIDE SERPL-SCNC: 107 MMOL/L (ref 98–107)
CO2 SERPL-SCNC: 24.7 MMOL/L (ref 22–29)
CREAT BLD-MCNC: 1.03 MG/DL (ref 0.57–1)
GFR SERPL CREATININE-BSD FRML MDRD: 63 ML/MIN/1.73
GLUCOSE BLD-MCNC: 85 MG/DL (ref 65–99)
POTASSIUM BLD-SCNC: 5 MMOL/L (ref 3.5–5.2)
SODIUM BLD-SCNC: 141 MMOL/L (ref 136–145)

## 2017-08-18 PROCEDURE — 80048 BASIC METABOLIC PNL TOTAL CA: CPT | Performed by: INTERNAL MEDICINE

## 2017-08-18 RX ORDER — FOLIC ACID 1 MG/1
1 TABLET ORAL DAILY
Status: DISCONTINUED | OUTPATIENT
Start: 2017-08-18 | End: 2017-08-29 | Stop reason: HOSPADM

## 2017-08-18 RX ORDER — OLANZAPINE 7.5 MG/1
15 TABLET ORAL NIGHTLY
Status: DISCONTINUED | OUTPATIENT
Start: 2017-08-25 | End: 2017-08-19

## 2017-08-18 RX ORDER — DIVALPROEX SODIUM 500 MG/1
1000 TABLET, EXTENDED RELEASE ORAL NIGHTLY
Status: DISCONTINUED | OUTPATIENT
Start: 2017-08-18 | End: 2017-08-29 | Stop reason: HOSPADM

## 2017-08-18 RX ADMIN — FOLIC ACID 1 MG: 1 TABLET ORAL at 12:28

## 2017-08-18 RX ADMIN — DIVALPROEX SODIUM 1000 MG: 500 TABLET, FILM COATED, EXTENDED RELEASE ORAL at 21:43

## 2017-08-19 LAB
CHOLEST SERPL-MCNC: 159 MG/DL (ref 0–200)
HDLC SERPL-MCNC: 61 MG/DL (ref 40–60)
LDLC SERPL CALC-MCNC: 87 MG/DL (ref 0–100)
LDLC/HDLC SERPL: 1.42 {RATIO}
TRIGL SERPL-MCNC: 57 MG/DL (ref 0–150)
VLDLC SERPL-MCNC: 11.4 MG/DL (ref 5–40)

## 2017-08-19 PROCEDURE — 80061 LIPID PANEL: CPT | Performed by: SPECIALIST

## 2017-08-19 RX ORDER — OLANZAPINE 7.5 MG/1
15 TABLET ORAL NIGHTLY
Status: DISCONTINUED | OUTPATIENT
Start: 2017-08-19 | End: 2017-08-29 | Stop reason: HOSPADM

## 2017-08-19 RX ADMIN — OLANZAPINE 15 MG: 7.5 TABLET, FILM COATED ORAL at 21:14

## 2017-08-19 RX ADMIN — DIVALPROEX SODIUM 1000 MG: 500 TABLET, FILM COATED, EXTENDED RELEASE ORAL at 21:10

## 2017-08-19 RX ADMIN — FOLIC ACID 1 MG: 1 TABLET ORAL at 08:31

## 2017-08-20 RX ADMIN — FOLIC ACID 1 MG: 1 TABLET ORAL at 08:30

## 2017-08-20 RX ADMIN — DIVALPROEX SODIUM 1000 MG: 500 TABLET, FILM COATED, EXTENDED RELEASE ORAL at 21:31

## 2017-08-20 RX ADMIN — OLANZAPINE 15 MG: 7.5 TABLET, FILM COATED ORAL at 21:31

## 2017-08-21 RX ADMIN — OLANZAPINE 15 MG: 7.5 TABLET, FILM COATED ORAL at 21:03

## 2017-08-21 RX ADMIN — DIVALPROEX SODIUM 1000 MG: 500 TABLET, FILM COATED, EXTENDED RELEASE ORAL at 21:03

## 2017-08-22 RX ORDER — DIPHENOXYLATE HYDROCHLORIDE AND ATROPINE SULFATE 2.5; .025 MG/1; MG/1
1 TABLET ORAL DAILY
Status: DISCONTINUED | OUTPATIENT
Start: 2017-08-22 | End: 2017-08-29 | Stop reason: HOSPADM

## 2017-08-22 RX ORDER — DIPHENOXYLATE HYDROCHLORIDE AND ATROPINE SULFATE 2.5; .025 MG/1; MG/1
1 TABLET ORAL DAILY
Status: DISCONTINUED | OUTPATIENT
Start: 2017-08-22 | End: 2017-08-22 | Stop reason: CLARIF

## 2017-08-22 RX ADMIN — DIVALPROEX SODIUM 1000 MG: 500 TABLET, FILM COATED, EXTENDED RELEASE ORAL at 21:17

## 2017-08-22 RX ADMIN — FOLIC ACID 1 MG: 1 TABLET ORAL at 08:10

## 2017-08-22 RX ADMIN — Medication 1 TABLET: at 12:13

## 2017-08-22 RX ADMIN — OLANZAPINE 15 MG: 7.5 TABLET, FILM COATED ORAL at 21:17

## 2017-08-23 LAB — VALPROATE SERPL-MCNC: 98 MCG/ML (ref 50–125)

## 2017-08-23 PROCEDURE — 80164 ASSAY DIPROPYLACETIC ACD TOT: CPT | Performed by: SPECIALIST

## 2017-08-23 RX ADMIN — FOLIC ACID 1 MG: 1 TABLET ORAL at 08:00

## 2017-08-23 RX ADMIN — OLANZAPINE 15 MG: 7.5 TABLET, FILM COATED ORAL at 21:36

## 2017-08-23 RX ADMIN — DIVALPROEX SODIUM 1000 MG: 500 TABLET, FILM COATED, EXTENDED RELEASE ORAL at 21:36

## 2017-08-23 RX ADMIN — Medication 1 TABLET: at 08:01

## 2017-08-24 RX ORDER — LITHIUM CARBONATE 450 MG
450 TABLET, EXTENDED RELEASE ORAL EVERY 12 HOURS SCHEDULED
Status: DISCONTINUED | OUTPATIENT
Start: 2017-08-24 | End: 2017-08-29 | Stop reason: HOSPADM

## 2017-08-24 RX ADMIN — LITHIUM CARBONATE 450 MG: 450 TABLET, EXTENDED RELEASE ORAL at 23:40

## 2017-08-24 RX ADMIN — Medication 1 TABLET: at 11:36

## 2017-08-24 RX ADMIN — FOLIC ACID 1 MG: 1 TABLET ORAL at 11:36

## 2017-08-24 RX ADMIN — OLANZAPINE 15 MG: 7.5 TABLET, FILM COATED ORAL at 23:40

## 2017-08-24 RX ADMIN — DIVALPROEX SODIUM 1000 MG: 500 TABLET, FILM COATED, EXTENDED RELEASE ORAL at 23:39

## 2017-08-25 RX ADMIN — Medication 1 TABLET: at 08:23

## 2017-08-25 RX ADMIN — FOLIC ACID 1 MG: 1 TABLET ORAL at 08:23

## 2017-08-25 RX ADMIN — LITHIUM CARBONATE 450 MG: 450 TABLET, EXTENDED RELEASE ORAL at 08:23

## 2017-08-25 RX ADMIN — LITHIUM CARBONATE 450 MG: 450 TABLET, EXTENDED RELEASE ORAL at 21:02

## 2017-08-25 RX ADMIN — DIVALPROEX SODIUM 1000 MG: 500 TABLET, FILM COATED, EXTENDED RELEASE ORAL at 21:02

## 2017-08-25 RX ADMIN — OLANZAPINE 15 MG: 7.5 TABLET, FILM COATED ORAL at 21:02

## 2017-08-26 RX ADMIN — LITHIUM CARBONATE 450 MG: 450 TABLET, EXTENDED RELEASE ORAL at 22:06

## 2017-08-26 RX ADMIN — DIVALPROEX SODIUM 1000 MG: 500 TABLET, FILM COATED, EXTENDED RELEASE ORAL at 22:06

## 2017-08-26 RX ADMIN — OLANZAPINE 15 MG: 7.5 TABLET, FILM COATED ORAL at 22:06

## 2017-08-26 RX ADMIN — FOLIC ACID 1 MG: 1 TABLET ORAL at 08:37

## 2017-08-26 RX ADMIN — Medication 1 TABLET: at 08:37

## 2017-08-26 RX ADMIN — LITHIUM CARBONATE 450 MG: 450 TABLET, EXTENDED RELEASE ORAL at 08:37

## 2017-08-27 LAB
LITHIUM SERPL-SCNC: 0.8 MMOL/L (ref 0.6–1.2)
VALPROATE SERPL-MCNC: 68 MCG/ML (ref 50–125)

## 2017-08-27 PROCEDURE — 80164 ASSAY DIPROPYLACETIC ACD TOT: CPT | Performed by: SPECIALIST

## 2017-08-27 PROCEDURE — 80178 ASSAY OF LITHIUM: CPT | Performed by: SPECIALIST

## 2017-08-27 RX ADMIN — DIVALPROEX SODIUM 1000 MG: 500 TABLET, FILM COATED, EXTENDED RELEASE ORAL at 22:28

## 2017-08-27 RX ADMIN — LITHIUM CARBONATE 450 MG: 450 TABLET, EXTENDED RELEASE ORAL at 22:28

## 2017-08-27 RX ADMIN — FOLIC ACID 1 MG: 1 TABLET ORAL at 08:51

## 2017-08-27 RX ADMIN — LITHIUM CARBONATE 450 MG: 450 TABLET, EXTENDED RELEASE ORAL at 08:51

## 2017-08-27 RX ADMIN — Medication 1 TABLET: at 08:51

## 2017-08-27 RX ADMIN — OLANZAPINE 15 MG: 7.5 TABLET, FILM COATED ORAL at 22:28

## 2017-08-28 RX ADMIN — OLANZAPINE 15 MG: 7.5 TABLET, FILM COATED ORAL at 21:44

## 2017-08-28 RX ADMIN — Medication 1 TABLET: at 08:48

## 2017-08-28 RX ADMIN — LITHIUM CARBONATE 450 MG: 450 TABLET, EXTENDED RELEASE ORAL at 08:48

## 2017-08-28 RX ADMIN — DIVALPROEX SODIUM 1000 MG: 500 TABLET, FILM COATED, EXTENDED RELEASE ORAL at 21:45

## 2017-08-28 RX ADMIN — LITHIUM CARBONATE 450 MG: 450 TABLET, EXTENDED RELEASE ORAL at 21:45

## 2017-08-28 RX ADMIN — FOLIC ACID 1 MG: 1 TABLET ORAL at 08:49

## 2017-08-29 VITALS
RESPIRATION RATE: 16 BRPM | SYSTOLIC BLOOD PRESSURE: 110 MMHG | HEART RATE: 79 BPM | OXYGEN SATURATION: 99 % | TEMPERATURE: 96.8 F | BODY MASS INDEX: 20.65 KG/M2 | WEIGHT: 126 LBS | DIASTOLIC BLOOD PRESSURE: 71 MMHG

## 2017-08-29 RX ORDER — OLANZAPINE 15 MG/1
15 TABLET ORAL NIGHTLY
Qty: 30 TABLET | Refills: 1 | Status: SHIPPED | OUTPATIENT
Start: 2017-08-29 | End: 2017-10-03 | Stop reason: HOSPADM

## 2017-08-29 RX ORDER — DIVALPROEX SODIUM 500 MG/1
1000 TABLET, EXTENDED RELEASE ORAL NIGHTLY
Qty: 60 TABLET | Refills: 1 | Status: SHIPPED | OUTPATIENT
Start: 2017-08-29 | End: 2017-10-03 | Stop reason: HOSPADM

## 2017-08-29 RX ORDER — FOLIC ACID 1 MG/1
1 TABLET ORAL DAILY
Qty: 30 TABLET | Refills: 1 | Status: SHIPPED | OUTPATIENT
Start: 2017-08-29 | End: 2021-09-15

## 2017-08-29 RX ORDER — DIPHENOXYLATE HYDROCHLORIDE AND ATROPINE SULFATE 2.5; .025 MG/1; MG/1
1 TABLET ORAL DAILY
Qty: 30 TABLET | Refills: 1 | Status: SHIPPED | OUTPATIENT
Start: 2017-08-29 | End: 2021-09-15

## 2017-08-29 RX ORDER — LITHIUM CARBONATE 450 MG
450 TABLET, EXTENDED RELEASE ORAL EVERY 12 HOURS SCHEDULED
Qty: 60 TABLET | Refills: 1 | Status: SHIPPED | OUTPATIENT
Start: 2017-08-29 | End: 2017-10-03 | Stop reason: HOSPADM

## 2017-08-29 RX ADMIN — Medication 1 TABLET: at 08:06

## 2017-08-29 RX ADMIN — LITHIUM CARBONATE 450 MG: 450 TABLET, EXTENDED RELEASE ORAL at 08:06

## 2017-08-29 RX ADMIN — FOLIC ACID 1 MG: 1 TABLET ORAL at 08:06

## 2017-08-30 ENCOUNTER — OFFICE VISIT (OUTPATIENT)
Dept: PSYCHIATRY | Facility: HOSPITAL | Age: 32
End: 2017-08-30

## 2017-08-30 DIAGNOSIS — F31.64 BIPOLAR DISORDER, CURR EPISODE MIXED, SEVERE, WITH PSYCHOTIC FEATURES (HCC): Primary | ICD-10-CM

## 2017-08-30 PROCEDURE — S9480 INTENSIVE OUTPATIENT PSYCHIA: HCPCS | Performed by: COUNSELOR

## 2017-08-30 NOTE — PROGRESS NOTES
Other     Information/activity     5491-3025  Inner/Outer Self Symbols in oil pastel    Instructor Serafin Dhillon, LPAT     2:32 PM     Patient Response Good participation

## 2017-08-30 NOTE — PLAN OF CARE
Problem:  Patient Care Overview (Adult)  Goal: Interdisciplinary Rounds/Family Conference  Tx team met.  Pt new IOP from [facility] today dealing with dx of Bipolar D/O and mixed phase prior to admission.  Calm today.  Good participation in all aspects of IOP.  Saw Dr. SINGH  Today.  No SI.   C/o increased appetite with new meds and some decrease in sleep.  Has a family hx. Of Mother with bipolar d/o and few extended family members.  Pleasant and cooperative with no current symptoms of psychosis.  F/U with Dr. SINGH And therapist at [facility] is planned.
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
## 2017-08-30 NOTE — PROGRESS NOTES
"The patient begins the intensive outpatient program today.  She reports that she had some disagreements with her mother last evening stating \"we just don't get along\".  She was not able to obtain a prescription for Zyprexa last evening as her pharmacy was out of this medication.  The importance of full compliance with her prescribed medications is discussed with her today and she will  her Zyprexa this evening.  "

## 2017-08-30 NOTE — PROGRESS NOTES
Wrap-up  Day 1 IOP  From [facility]  Where was dealing with fercho from a Bipolar D/O but not signs of fercho today.  Mood at  2 with 10 being the worse    Increased appetite  Decreased sleep  NO SI  Good participation in classes and groups  Saw MD today  Goals for later today:  Birthday shopping with a friend and have dinner with Dad

## 2017-08-31 ENCOUNTER — OFFICE VISIT (OUTPATIENT)
Dept: PSYCHIATRY | Facility: HOSPITAL | Age: 32
End: 2017-08-31

## 2017-08-31 DIAGNOSIS — F31.64 BIPOLAR DISORDER, CURR EPISODE MIXED, SEVERE, WITH PSYCHOTIC FEATURES (HCC): Primary | ICD-10-CM

## 2017-08-31 PROCEDURE — S9480 INTENSIVE OUTPATIENT PSYCHIA: HCPCS | Performed by: COUNSELOR

## 2017-09-01 ENCOUNTER — OFFICE VISIT (OUTPATIENT)
Dept: PSYCHIATRY | Facility: HOSPITAL | Age: 32
End: 2017-09-01

## 2017-09-01 DIAGNOSIS — F31.64 BIPOLAR DISORDER, CURR EPISODE MIXED, SEVERE, WITH PSYCHOTIC FEATURES (HCC): Primary | ICD-10-CM

## 2017-09-01 PROCEDURE — S9480 INTENSIVE OUTPATIENT PSYCHIA: HCPCS | Performed by: SOCIAL WORKER

## 2017-09-01 NOTE — PROGRESS NOTES
Teaching Record: [facility] Class 0900 - 6822  Information / activity:  Safety Planning    Good participation      Gracie Cornelius, LCSW

## 2017-09-01 NOTE — PROGRESS NOTES
Wrap up note:  Patient rates her mood at 8, enjoyed group, and reported an increased appetite as her only symptom.  In group, however, she talked about being tired.  No SI/HI, good plan for the weekend.  Patient is interested in learning more about bipolar disorder-signs and symptoms, and the difference between Type I and II.

## 2017-09-01 NOTE — PROGRESS NOTES
IOP  Group note  Observations:  10:00am-11:30am    Engaged in Activity / Process and Self -disclosed: Yes  Applies Topic to self: Yes  Able to give Constructive Feedback: Yes  Affect: bright  Degree of Insightful Thinking 5  Notes:  Patient was engaged in the group process and asked the group for feedback on 2 issues that she had concerns about.  Group members gave feedback that she was able to take in.  She asked about the value of having an outpatient therapist after discharge which led to a good discussion.

## 2017-09-05 ENCOUNTER — OFFICE VISIT (OUTPATIENT)
Dept: PSYCHIATRY | Facility: HOSPITAL | Age: 32
End: 2017-09-05

## 2017-09-05 DIAGNOSIS — F31.64 BIPOLAR DISORDER, CURR EPISODE MIXED, SEVERE, WITH PSYCHOTIC FEATURES (HCC): Primary | ICD-10-CM

## 2017-09-05 PROCEDURE — S9480 INTENSIVE OUTPATIENT PSYCHIA: HCPCS | Performed by: COUNSELOR

## 2017-09-05 NOTE — PROGRESS NOTES
Mental Health Awareness Class   ([id number])  Information/activity     Finding Balance    Instructor Lara Regalado LCSW     10:27 AM     Patient Response Good participation  Patient was actively engaged in group exercise and discussion.

## 2017-09-05 NOTE — PROGRESS NOTES
IOP  Group note   Observations:    Engaged in Activity / Process and Self -disclosed: Yes  Applies Topic to self: Yes  Able to give Constructive Feedback: Yes  Affect: flat  Degree of Insightful Thinking 6  Notes:  Stated continues to feel rather lethargic from the new meds but mood has stabilized.   Had a good weekend with father who has now gone back home.   NO SI.   Has a friend coming in this weekend.      Maggi Jiménez, [facility]

## 2017-09-05 NOTE — PROGRESS NOTES
Wrap-up  Day 4 IOP  Mood at 3  with 10 being the worse    Increased appetite-excessive  Fatigue since new meds  NO SI  Good weekend was reported  Goals for later today:  Set up some medical appts. And go for a walk

## 2017-09-06 ENCOUNTER — OFFICE VISIT (OUTPATIENT)
Dept: PSYCHIATRY | Facility: HOSPITAL | Age: 32
End: 2017-09-06

## 2017-09-06 DIAGNOSIS — F31.64 BIPOLAR DISORDER, CURR EPISODE MIXED, SEVERE, WITH PSYCHOTIC FEATURES (HCC): Primary | ICD-10-CM

## 2017-09-06 PROCEDURE — S9480 INTENSIVE OUTPATIENT PSYCHIA: HCPCS | Performed by: COUNSELOR

## 2017-09-06 NOTE — PLAN OF CARE
Problem:  Patient Care Overview (Adult)  Goal: Interdisciplinary Rounds/Family Conference  Tx team met.  Continues in IOP.  Continues to report more herberth mood.  No symptoms of psychosis.  Father had been in town for several days but has gone home this week.  Has a male friend coming in town for a few days this coming weekend.  NO SI.  Reporting lethargy and voracious appetite from meds, saw Dr. HAY. Who is adjusting them slowly.  Good participation in classes and groups.   F/u will be with Dr. HAY and [facility] therapist.

## 2017-09-06 NOTE — PROGRESS NOTES
The patient is complaining of some daytime sedation with her current medication regimen.  I will reduce her dose of olanzapine to 7.5 mg at at bedtime and anticipate discontinuation of this medication the next 3-5 weeks.  The patient denies suicidal relation and plans to return to work on 917.

## 2017-09-06 NOTE — PROGRESS NOTES
IOP  Group note   Observations:    Engaged in Activity / Process and Self -disclosed: Yes  Applies Topic to self: Yes  Able to give Constructive Feedback: Yes  Affect: bright  Degree of Insightful Thinking 6  Notes:  Please to report was able to overcome some social anxiety and accepted a friend's invitation to go out to dinner last night.  Had a good time.  Left group shortly to speak with Dr. SINGH Who adjusted meds.   NO SI.        Maggi Jiménez, [facility]

## 2017-09-06 NOTE — PROGRESS NOTES
Wrap-up  Day 5 IOP  Mood at 1 with 10 being the worse    An increase/decrease in normal appetite  NO SI  Good participation in classes and groups.  Meds were adjusted by Dr. SINGH    Goals for later today:  Make several medical appts and get bills paid.

## 2017-09-07 ENCOUNTER — OFFICE VISIT (OUTPATIENT)
Dept: PSYCHIATRY | Facility: HOSPITAL | Age: 32
End: 2017-09-07

## 2017-09-07 DIAGNOSIS — F31.64 BIPOLAR DISORDER, CURR EPISODE MIXED, SEVERE, WITH PSYCHOTIC FEATURES (HCC): Primary | ICD-10-CM

## 2017-09-07 PROCEDURE — S9480 INTENSIVE OUTPATIENT PSYCHIA: HCPCS | Performed by: COUNSELOR

## 2017-09-07 NOTE — PROGRESS NOTES
IOP  Group note    Observations:    Engaged in Activity / Process and Self -disclosed: Yes  Applies Topic to self: Yes  Able to give Constructive Feedback: Yes  Affect: bright  Degree of Insightful Thinking 6  Notes:  Stated had a good day yesterday--paid bills and had a good conversation with male friend who will be visiting this weekend.  Able to give support to peers today.  NO SI.  Reports less lethargic with recent med change.        Maggi Jiménez, [facility]

## 2017-09-07 NOTE — PROGRESS NOTES
Wrap-up  Day 6 IOP  Mood at 1  with 10 being the worse    An increase/decrease in normal appetite   NO SI  Good participation in classes and groups  Goals for later today:  Clean house and buy new make-up as a way to pamper self.

## 2017-09-08 ENCOUNTER — OFFICE VISIT (OUTPATIENT)
Dept: PSYCHIATRY | Facility: HOSPITAL | Age: 32
End: 2017-09-08

## 2017-09-08 DIAGNOSIS — F31.64 BIPOLAR DISORDER, CURR EPISODE MIXED, SEVERE, WITH PSYCHOTIC FEATURES (HCC): Primary | ICD-10-CM

## 2017-09-08 PROCEDURE — S9480 INTENSIVE OUTPATIENT PSYCHIA: HCPCS | Performed by: COUNSELOR

## 2017-09-08 NOTE — PROGRESS NOTES
Wrap-up  Day 7 IOP  Mood at 1  with 10 being the worse    Increased appetite  NO SI  Bright affect  Looking forward to friend visiting this weekend  Goals:  Grocery shopping, cook meals and finish preparing house for weekend guest.   Many fun activities planned for the weekend.  Planned absence for 9/11 and return on 9/12.

## 2017-09-08 NOTE — PROGRESS NOTES
[id number] Psych IOP class     Class topic: passive/aggressive/assertive communication and behavior     Class participation: good

## 2017-09-08 NOTE — PROGRESS NOTES
IOP  Group note   Observations:    Engaged in Activity / Process and Self -disclosed: Yes  Applies Topic to self: Yes  Able to give Constructive Feedback: Yes  Affect: bright  Degree of Insightful Thinking 6  Notes: Reported less sedation last night after meds were adjusted.  Looking forward to fun weekend with friend who is going to visit.  NO SI.  Left group to speak with MD. Maggi Jiménez, [facility]

## 2017-09-08 NOTE — PROGRESS NOTES
The patient reports that she is feeling less sedated with reduction in her Zyprexa dose.  She is maintaining stable mood and compliant with medications.  Her progress is encouraging.

## 2017-09-08 NOTE — PROGRESS NOTES
Other     Information/activity       Poems and Stories as Sources of Hope    Instructor Chemo Meyer     10:47 AM     Patient Response Good participation

## 2017-09-12 ENCOUNTER — OFFICE VISIT (OUTPATIENT)
Dept: PSYCHIATRY | Facility: HOSPITAL | Age: 32
End: 2017-09-12

## 2017-09-12 DIAGNOSIS — F31.64 BIPOLAR DISORDER, CURR EPISODE MIXED, SEVERE, WITH PSYCHOTIC FEATURES (HCC): Primary | ICD-10-CM

## 2017-09-12 PROCEDURE — S9480 INTENSIVE OUTPATIENT PSYCHIA: HCPCS | Performed by: COUNSELOR

## 2017-09-12 NOTE — PROGRESS NOTES
Other     Information/activity     [id number]    Instructor Maggi Jiménez [facility]     9:11 AM     Patient Response Good participation

## 2017-09-12 NOTE — PROGRESS NOTES
Wrap-up  Day 8 IOP   IOP  Group note     Observations:    Engaged in Activity / Process and Self -disclosed: Yes  Applies Topic to self: Yes  Able to give Constructive Feedback: Yes  Affect: bright  Degree of Insightful Thinking 6  Notes:  Reported a very good weekend with guest.   Very active, had positive conversations and educated this male, love interest, about illness.  Mood remains herberth  And taking meds as RX.  NO SI.         Maggi Jiménez, [facility]

## 2017-09-14 ENCOUNTER — OFFICE VISIT (OUTPATIENT)
Dept: PSYCHIATRY | Facility: HOSPITAL | Age: 32
End: 2017-09-14

## 2017-09-14 DIAGNOSIS — F31.64 BIPOLAR DISORDER, CURR EPISODE MIXED, SEVERE, WITH PSYCHOTIC FEATURES (HCC): Primary | ICD-10-CM

## 2017-09-14 PROCEDURE — S9480 INTENSIVE OUTPATIENT PSYCHIA: HCPCS | Performed by: COUNSELOR

## 2017-09-14 NOTE — PROGRESS NOTES
Other     Information/activity       Five Dimensions of Wholeness        Instructor Chemo Meyer     1:16 PM     Patient Response Good participation

## 2017-09-14 NOTE — PROGRESS NOTES
Wrap-up  Day 9 IOP  Mood at 1  with 10 being the worse    Increased appetite from the meds  Fatigue   NO SI  Requests d/c today.  Discharge Summary    Alexus attended  9 IOP Sessions         Registration Date 8/30/17  Discharge Date  9/14/2017       Summary of Treatment and Progress towards goals:   Good participation in classes and groups.  Meds were adjusted.  Mood herberth.  Reaching out to family and friend for support.  No SI.  No symptoms of psychosis.    Diagnostic Impression:   Bipolar disorder, mixed, severe with psychosis on admission        Current Medications:  Current Outpatient Prescriptions   Medication Sig Dispense Refill   • divalproex (DEPAKOTE) 500 MG 24 hr tablet Take 2 tablets by mouth Every Night. 60 tablet 1   • folic acid (FOLVITE) 1 MG tablet Take 1 tablet by mouth Daily. 30 tablet 1   • lithium (ESKALITH) 450 MG CR tablet Take 1 tablet by mouth Every 12 (Twelve) Hours. 60 tablet 1   • multivitamin (THERAGRAN) tablet tablet Take 1 tablet by mouth Daily. 30 tablet 1   • OLANZapine (zyPREXA) 15 MG tablet Take 1 tablet by mouth Every Night. 30 tablet 1     No current facility-administered medications for this visit.        Referrals/ Appointments :   Dr. Markie MD   10/12/17   [phone]  Therapist at [facility]  (awaiting callback)   [phone]        Maggi Jiménez [facility]

## 2017-09-15 ENCOUNTER — APPOINTMENT (OUTPATIENT)
Dept: PSYCHIATRY | Facility: HOSPITAL | Age: 32
End: 2017-09-15

## 2017-09-18 ENCOUNTER — APPOINTMENT (OUTPATIENT)
Dept: PSYCHIATRY | Facility: HOSPITAL | Age: 32
End: 2017-09-18

## 2017-09-19 ENCOUNTER — APPOINTMENT (OUTPATIENT)
Dept: PSYCHIATRY | Facility: HOSPITAL | Age: 32
End: 2017-09-19

## 2017-09-20 ENCOUNTER — APPOINTMENT (OUTPATIENT)
Dept: PSYCHIATRY | Facility: HOSPITAL | Age: 32
End: 2017-09-20

## 2017-09-21 ENCOUNTER — APPOINTMENT (OUTPATIENT)
Dept: PSYCHIATRY | Facility: HOSPITAL | Age: 32
End: 2017-09-21

## 2017-09-22 ENCOUNTER — APPOINTMENT (OUTPATIENT)
Dept: PSYCHIATRY | Facility: HOSPITAL | Age: 32
End: 2017-09-22

## 2017-09-25 ENCOUNTER — APPOINTMENT (OUTPATIENT)
Dept: PSYCHIATRY | Facility: HOSPITAL | Age: 32
End: 2017-09-25

## 2017-09-26 ENCOUNTER — APPOINTMENT (OUTPATIENT)
Dept: PSYCHIATRY | Facility: HOSPITAL | Age: 32
End: 2017-09-26

## 2017-09-28 ENCOUNTER — HOSPITAL ENCOUNTER (EMERGENCY)
Facility: HOSPITAL | Age: 32
Discharge: PSYCHIATRIC OR SUBSTANCE USE FACILITY | End: 2017-09-28
Attending: EMERGENCY MEDICINE | Admitting: EMERGENCY MEDICINE

## 2017-09-28 ENCOUNTER — HOSPITAL ENCOUNTER (INPATIENT)
Facility: HOSPITAL | Age: 32
LOS: 5 days | Discharge: HOME | End: 2017-10-03
Attending: SPECIALIST | Admitting: SPECIALIST

## 2017-09-28 VITALS
HEART RATE: 79 BPM | WEIGHT: 125 LBS | DIASTOLIC BLOOD PRESSURE: 92 MMHG | SYSTOLIC BLOOD PRESSURE: 152 MMHG | HEIGHT: 66 IN | BODY MASS INDEX: 20.09 KG/M2 | OXYGEN SATURATION: 100 % | TEMPERATURE: 98.7 F | RESPIRATION RATE: 16 BRPM

## 2017-09-28 DIAGNOSIS — F31.12 BIPOLAR 1 DISORDER, MANIC, MODERATE (HCC): Primary | ICD-10-CM

## 2017-09-28 DIAGNOSIS — N28.9 RENAL INSUFFICIENCY: Primary | ICD-10-CM

## 2017-09-28 PROBLEM — F31.2 BIPOLAR AFFECTIVE DISORDER, MANIC, SEVERE, WITH PSYCHOTIC BEHAVIOR: Status: ACTIVE | Noted: 2017-09-28

## 2017-09-28 PROBLEM — Z87.448 HISTORY OF PYELONEPHRITIS: Status: ACTIVE | Noted: 2017-09-28

## 2017-09-28 PROBLEM — E87.6 HYPOKALEMIA: Status: ACTIVE | Noted: 2017-09-28

## 2017-09-28 LAB
ALBUMIN SERPL-MCNC: 4.6 G/DL (ref 3.5–5.2)
ALBUMIN/GLOB SERPL: 1.4 G/DL
ALP SERPL-CCNC: 49 U/L (ref 39–117)
ALT SERPL W P-5'-P-CCNC: 13 U/L (ref 1–33)
ANION GAP SERPL CALCULATED.3IONS-SCNC: 27.5 MMOL/L
AST SERPL-CCNC: 19 U/L (ref 1–32)
BASOPHILS # BLD AUTO: 0.15 10*3/MM3 (ref 0–0.2)
BASOPHILS NFR BLD AUTO: 2.4 % (ref 0–1.5)
BILIRUB SERPL-MCNC: 0.8 MG/DL (ref 0.1–1.2)
BUN BLD-MCNC: 15 MG/DL (ref 6–20)
BUN/CREAT SERPL: 11 (ref 7–25)
CALCIUM SPEC-SCNC: 9.6 MG/DL (ref 8.6–10.5)
CHLORIDE SERPL-SCNC: 98 MMOL/L (ref 98–107)
CO2 SERPL-SCNC: 14.5 MMOL/L (ref 22–29)
CREAT BLD-MCNC: 1.36 MG/DL (ref 0.57–1)
DEPRECATED RDW RBC AUTO: 53.7 FL (ref 37–54)
EOSINOPHIL # BLD AUTO: 0.2 10*3/MM3 (ref 0–0.7)
EOSINOPHIL NFR BLD AUTO: 3.1 % (ref 0.3–6.2)
ERYTHROCYTE [DISTWIDTH] IN BLOOD BY AUTOMATED COUNT: 14.4 % (ref 11.7–13)
ETHANOL BLD-MCNC: <10 MG/DL (ref 0–10)
ETHANOL UR QL: <0.01 %
GFR SERPL CREATININE-BSD FRML MDRD: 45 ML/MIN/1.73
GLOBULIN UR ELPH-MCNC: 3.4 GM/DL
GLUCOSE BLD-MCNC: 136 MG/DL (ref 65–99)
HCG SERPL QL: NEGATIVE
HCT VFR BLD AUTO: 40.5 % (ref 35.6–45.5)
HGB BLD-MCNC: 13.2 G/DL (ref 11.9–15.5)
IMM GRANULOCYTES # BLD: 0.03 10*3/MM3 (ref 0–0.03)
IMM GRANULOCYTES NFR BLD: 0.5 % (ref 0–0.5)
LITHIUM SERPL-SCNC: <0.1 MMOL/L (ref 0.6–1.2)
LYMPHOCYTES # BLD AUTO: 1.53 10*3/MM3 (ref 0.9–4.8)
LYMPHOCYTES NFR BLD AUTO: 24 % (ref 19.6–45.3)
MCH RBC QN AUTO: 32.9 PG (ref 26.9–32)
MCHC RBC AUTO-ENTMCNC: 32.6 G/DL (ref 32.4–36.3)
MCV RBC AUTO: 101 FL (ref 80.5–98.2)
MONOCYTES # BLD AUTO: 0.59 10*3/MM3 (ref 0.2–1.2)
MONOCYTES NFR BLD AUTO: 9.2 % (ref 5–12)
NEUTROPHILS # BLD AUTO: 3.88 10*3/MM3 (ref 1.9–8.1)
NEUTROPHILS NFR BLD AUTO: 60.8 % (ref 42.7–76)
PLATELET # BLD AUTO: 233 10*3/MM3 (ref 140–500)
PMV BLD AUTO: 10.2 FL (ref 6–12)
POTASSIUM BLD-SCNC: 3.3 MMOL/L (ref 3.5–5.2)
PROT SERPL-MCNC: 8 G/DL (ref 6–8.5)
RBC # BLD AUTO: 4.01 10*6/MM3 (ref 3.9–5.2)
SODIUM BLD-SCNC: 140 MMOL/L (ref 136–145)
VALPROATE SERPL-MCNC: <2.8 MCG/ML (ref 50–125)
WBC NRBC COR # BLD: 6.38 10*3/MM3 (ref 4.5–10.7)

## 2017-09-28 PROCEDURE — 80164 ASSAY DIPROPYLACETIC ACD TOT: CPT | Performed by: EMERGENCY MEDICINE

## 2017-09-28 PROCEDURE — 96372 THER/PROPH/DIAG INJ SC/IM: CPT

## 2017-09-28 PROCEDURE — 36415 COLL VENOUS BLD VENIPUNCTURE: CPT

## 2017-09-28 PROCEDURE — 84703 CHORIONIC GONADOTROPIN ASSAY: CPT | Performed by: EMERGENCY MEDICINE

## 2017-09-28 PROCEDURE — 85025 COMPLETE CBC W/AUTO DIFF WBC: CPT | Performed by: EMERGENCY MEDICINE

## 2017-09-28 PROCEDURE — 90791 PSYCH DIAGNOSTIC EVALUATION: CPT

## 2017-09-28 PROCEDURE — 99284 EMERGENCY DEPT VISIT MOD MDM: CPT

## 2017-09-28 PROCEDURE — 80053 COMPREHEN METABOLIC PANEL: CPT | Performed by: EMERGENCY MEDICINE

## 2017-09-28 PROCEDURE — 80178 ASSAY OF LITHIUM: CPT | Performed by: EMERGENCY MEDICINE

## 2017-09-28 PROCEDURE — 80307 DRUG TEST PRSMV CHEM ANLYZR: CPT | Performed by: EMERGENCY MEDICINE

## 2017-09-28 RX ORDER — SODIUM CHLORIDE AND POTASSIUM CHLORIDE 150; 900 MG/100ML; MG/100ML
125 INJECTION, SOLUTION INTRAVENOUS CONTINUOUS
Status: DISCONTINUED | OUTPATIENT
Start: 2017-09-28 | End: 2017-09-29

## 2017-09-28 RX ORDER — OLANZAPINE 10 MG/1
10 INJECTION, POWDER, LYOPHILIZED, FOR SOLUTION INTRAMUSCULAR ONCE
Status: COMPLETED | OUTPATIENT
Start: 2017-09-28 | End: 2017-09-28

## 2017-09-28 RX ORDER — DIVALPROEX SODIUM 500 MG/1
1000 TABLET, EXTENDED RELEASE ORAL NIGHTLY
Status: COMPLETED | OUTPATIENT
Start: 2017-09-28 | End: 2017-09-28

## 2017-09-28 RX ORDER — OLANZAPINE 10 MG/1
INJECTION, POWDER, LYOPHILIZED, FOR SOLUTION INTRAMUSCULAR
Status: COMPLETED
Start: 2017-09-28 | End: 2017-09-28

## 2017-09-28 RX ORDER — ALUMINA, MAGNESIA, AND SIMETHICONE 2400; 2400; 240 MG/30ML; MG/30ML; MG/30ML
15 SUSPENSION ORAL EVERY 6 HOURS PRN
Status: DISCONTINUED | OUTPATIENT
Start: 2017-09-28 | End: 2017-10-03 | Stop reason: HOSPADM

## 2017-09-28 RX ORDER — ACETAMINOPHEN 325 MG/1
650 TABLET ORAL EVERY 4 HOURS PRN
Status: DISCONTINUED | OUTPATIENT
Start: 2017-09-28 | End: 2017-10-03 | Stop reason: HOSPADM

## 2017-09-28 RX ADMIN — OLANZAPINE 10 MG: 10 INJECTION, POWDER, FOR SOLUTION INTRAMUSCULAR at 14:58

## 2017-09-28 RX ADMIN — DIVALPROEX SODIUM 1000 MG: 500 TABLET, EXTENDED RELEASE ORAL at 22:46

## 2017-09-28 RX ADMIN — OLANZAPINE 15 MG: 10 TABLET, ORALLY DISINTEGRATING ORAL at 22:46

## 2017-09-28 RX ADMIN — OLANZAPINE 10 MG: 10 INJECTION, POWDER, LYOPHILIZED, FOR SOLUTION INTRAMUSCULAR at 14:58

## 2017-09-29 PROBLEM — E87.29 HIGH ANION GAP METABOLIC ACIDOSIS: Status: ACTIVE | Noted: 2017-09-29

## 2017-09-29 LAB
ANION GAP SERPL CALCULATED.3IONS-SCNC: 11.5 MMOL/L
BACTERIA UR QL AUTO: ABNORMAL /HPF
BILIRUB UR QL STRIP: NEGATIVE
BUN BLD-MCNC: 15 MG/DL (ref 6–20)
BUN/CREAT SERPL: 16 (ref 7–25)
CALCIUM SPEC-SCNC: 8.5 MG/DL (ref 8.6–10.5)
CHLORIDE SERPL-SCNC: 107 MMOL/L (ref 98–107)
CLARITY UR: CLEAR
CO2 SERPL-SCNC: 23.5 MMOL/L (ref 22–29)
COLOR UR: YELLOW
CREAT BLD-MCNC: 0.94 MG/DL (ref 0.57–1)
GFR SERPL CREATININE-BSD FRML MDRD: 69 ML/MIN/1.73
GLUCOSE BLD-MCNC: 77 MG/DL (ref 65–99)
GLUCOSE UR STRIP-MCNC: NEGATIVE MG/DL
HGB UR QL STRIP.AUTO: ABNORMAL
HYALINE CASTS UR QL AUTO: ABNORMAL /LPF
KETONES UR QL STRIP: NEGATIVE
LEUKOCYTE ESTERASE UR QL STRIP.AUTO: NEGATIVE
NITRITE UR QL STRIP: NEGATIVE
PH UR STRIP.AUTO: 5.5 [PH] (ref 5–8)
POTASSIUM BLD-SCNC: 4.3 MMOL/L (ref 3.5–5.2)
PROT UR QL STRIP: NEGATIVE
RBC # UR: ABNORMAL /HPF
REF LAB TEST METHOD: ABNORMAL
SODIUM BLD-SCNC: 142 MMOL/L (ref 136–145)
SP GR UR STRIP: 1.02 (ref 1–1.03)
SQUAMOUS #/AREA URNS HPF: ABNORMAL /HPF
UROBILINOGEN UR QL STRIP: ABNORMAL
WBC UR QL AUTO: ABNORMAL /HPF

## 2017-09-29 PROCEDURE — 25810000003 SODIUM CHLORIDE 0.9 % WITH KCL 20 MEQ 20-0.9 MEQ/L-% SOLUTION: Performed by: INTERNAL MEDICINE

## 2017-09-29 PROCEDURE — 81001 URINALYSIS AUTO W/SCOPE: CPT | Performed by: SPECIALIST

## 2017-09-29 PROCEDURE — 80048 BASIC METABOLIC PNL TOTAL CA: CPT | Performed by: HOSPITALIST

## 2017-09-29 RX ORDER — OLANZAPINE 10 MG/1
20 TABLET, ORALLY DISINTEGRATING ORAL NIGHTLY
Status: DISCONTINUED | OUTPATIENT
Start: 2017-09-29 | End: 2017-10-03 | Stop reason: HOSPADM

## 2017-09-29 RX ORDER — DIVALPROEX SODIUM 500 MG/1
1000 TABLET, EXTENDED RELEASE ORAL NIGHTLY
Status: DISCONTINUED | OUTPATIENT
Start: 2017-09-29 | End: 2017-10-03 | Stop reason: HOSPADM

## 2017-09-29 RX ADMIN — POTASSIUM CHLORIDE AND SODIUM CHLORIDE 125 ML/HR: 900; 150 INJECTION, SOLUTION INTRAVENOUS at 08:08

## 2017-09-29 RX ADMIN — POTASSIUM CHLORIDE AND SODIUM CHLORIDE 125 ML/HR: 900; 150 INJECTION, SOLUTION INTRAVENOUS at 00:07

## 2017-09-29 RX ADMIN — OLANZAPINE 20 MG: 10 TABLET, ORALLY DISINTEGRATING ORAL at 21:15

## 2017-09-29 RX ADMIN — DIVALPROEX SODIUM 1000 MG: 500 TABLET, FILM COATED, EXTENDED RELEASE ORAL at 21:15

## 2017-09-30 RX ADMIN — OLANZAPINE 20 MG: 10 TABLET, ORALLY DISINTEGRATING ORAL at 21:53

## 2017-09-30 RX ADMIN — DIVALPROEX SODIUM 1000 MG: 500 TABLET, FILM COATED, EXTENDED RELEASE ORAL at 21:53

## 2017-10-01 RX ADMIN — DIVALPROEX SODIUM 1000 MG: 500 TABLET, FILM COATED, EXTENDED RELEASE ORAL at 21:29

## 2017-10-01 RX ADMIN — ACETAMINOPHEN 650 MG: 325 TABLET ORAL at 09:46

## 2017-10-01 RX ADMIN — OLANZAPINE 20 MG: 10 TABLET, ORALLY DISINTEGRATING ORAL at 21:29

## 2017-10-01 NOTE — PROGRESS NOTES
The patient remains pleasant and cooperative with a tad of elevation of mood still in evidence.  I have again spoken with the patient frankly regarding her need to continue full compliance with her prescribed medications.  The patient's mother has called and reports the patient has continued to abuse and synthetic marijuana outside the hospital and the patient will be confronted regarding this.
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
## 2017-10-01 NOTE — PLAN OF CARE
Problem:  Patient Care Overview (Adult)  Goal: Plan of Care Review  Outcome: Ongoing (interventions implemented as appropriate)    10/01/17 0330   Coping/Psychosocial Response Interventions   Plan Of Care Reviewed With patient   Coping/Psychosocial   Patient Agreement with Plan of Care agrees   Outcome Evaluation   Outcome Summary/Follow up Plan On assessment, pt denies all symptoms---anxiety, depression, SI, HI, and hallucinations. She was adherent to prescribed HS meds, but was preoccupied with side effects and stated she intends to discuss alternatives with the psychiatrist in the morning. 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 Will continue to monitor patient for safety.        Goal: Individualization and Mutuality  Outcome: Ongoing (interventions implemented as appropriate)    10/01/17 0306   Behavioral Health Screens   Patient Personal Strengths expressive of needs;family/social support;positive vocational history;positive educational history   Patient Vulnerabilities Pt is resistant to the idea of adhering to medications when outpatient    Mutuality/Individual Preferences   What Anxieties, Fears or Concerns Do You Have About Your Health or Care? pt is concerned about medication side effects          Problem:  Overarching Goals  Goal: Adheres to Safety Considerations for Self and Others  Intervention: Develop/maintain Individualized Safety Plan    10/01/17 0306 10/01/17 0330   Safety   Safety Measures suicide check-in completed;safety rounds completed --    Provide Emotional/Physical Safety   Suicidal Ideation --  no   Willingness to Contact Staff Member if Feeling Like Hurting Self --  yes   Mental Health Homicide Risk   Homicidal Ideation --  no   Willingness to Contact Staff Member if Feeling Like Hurting Others --  yes         10/01/17 0306 10/01/17 0330   Safety    Safety Measures suicide check-in completed;safety rounds completed --    Provide Emotional/Physical Safety   Suicidal Ideation --  no   Willingness to Contact Staff Member if Feeling Like Hurting Self --  yes   Mental Health Homicide Risk   Homicidal Ideation --  no   Willingness to Contact Staff Member if Feeling Like Hurting Others --  yes         Goal: Optimized Coping Skills in Response to Life Stressors  Intervention: Promote Effective Coping Strategies    10/01/17 0306   Coping Strategies   Supportive Measures active listening utilized;verbalization of feelings encouraged

## 2017-10-02 RX ADMIN — OLANZAPINE 20 MG: 10 TABLET, ORALLY DISINTEGRATING ORAL at 21:22

## 2017-10-02 RX ADMIN — DIVALPROEX SODIUM 1000 MG: 500 TABLET, FILM COATED, EXTENDED RELEASE ORAL at 21:22

## 2017-10-02 NOTE — PLAN OF CARE
"Problem:  Patient Care Overview (Adult)  Goal: Plan of Care Review  Outcome: Ongoing (interventions implemented as appropriate)    10/02/17 1340   Coping/Psychosocial Response Interventions   Plan Of Care Reviewed With patient   Coping/Psychosocial   Patient Agreement with Plan of Care agrees   Patient Care Overview   Progress improving   Outcome Evaluation   Outcome Summary/Follow up Plan Patient is pleasent and cooperative with medication and programming. She denies depression, but does report feeling anxious referring to it as feeling \"antsy\". She is restless, does stay active by walking the hallway frequently and even exercising/stretching at times in her room and hallway. She is talkative and can be intrusive at times with other peers. She denies SI/HI and hallucinations. She is scheduled to have a family session tomorrow. Will continue to monitor and provide support.        Goal: Interdisciplinary Rounds/Family Conference  Outcome: Ongoing (interventions implemented as appropriate)  Goal: Individualization and Mutuality  Outcome: Ongoing (interventions implemented as appropriate)  Goal: Discharge Needs Assessment  Outcome: Ongoing (interventions implemented as appropriate)    Problem:  Overarching Goals  Goal: Adheres to Safety Considerations for Self and Others  Outcome: Ongoing (interventions implemented as appropriate)    09/28/17 1751   Overarching Goals   Adheres to Safety Considerations pt will be monitored for safety. denying SI t this time       Intervention: Develop/maintain Individualized Safety Plan    10/02/17 0940   Safety   Safety Measures safety rounds completed;suicide assessment completed   Provide Emotional/Physical Safety   Suicidal Ideation no   Willingness to Contact Staff Member if Feeling Like Hurting Self yes   Mental Health Homicide Risk   Homicidal Ideation no   Willingness to Contact Staff Member if Feeling Like Hurting Others yes         Goal: Optimized Coping Skills in Response " to Life Stressors  Outcome: Ongoing (interventions implemented as appropriate)    09/28/17 1751   Overarching Goals   Optimized Coping Skills pt will identify two coping skills prior to d/c       Intervention: Promote Effective Coping Strategies    10/02/17 0940   Coping Strategies   Supportive Measures active listening utilized;positive reinforcement provided;verbalization of feelings encouraged         Goal: Develops/Participates in Therapeutic [place] to Support Successful Transition  Outcome: Ongoing (interventions implemented as appropriate)    09/28/17 1751   Patient Care Overview   Develop/Participate Therapeutic [place] Pt will participate in programming while inpt       Intervention: Foster Therapeutic [place]    10/02/17 0940   Coping Strategies   Trust Relationship/Rapport care explained;choices provided;emotional support provided;empathic listening provided;questions answered;questions encouraged;reassurance provided;thoughts/feelings acknowledged       Intervention: Mutually Develop Transition Plan    10/02/17 0940   OTHER   Transition Support crisis management plan promoted           Problem: Alteration in Thoughts and Perception  Goal: Treatment Goal: Gain control of psychotic behaviors/thinking, reduce/eliminate presenting symptoms and demonstrate improved reality functioning upon discharge  Outcome: Ongoing (interventions implemented as appropriate)  Goal: Verbalize thoughts and feelings associated with:  Outcome: Ongoing (interventions implemented as appropriate)  Goal: Refrain from acting on delusional thinking/internal stimuli  Outcome: Ongoing (interventions implemented as appropriate)  Goal: Agree to be compliant with medication regime, as prescribed and report medication side effects  Outcome: Ongoing (interventions implemented as appropriate)  Goal: Attend and participate in unit activities, including therapeutic, recreational, and educational groups  Outcome: Ongoing (interventions  implemented as appropriate)  Goal: Recognize dysfunctional thoughts, communicate reality-based thoughts at the time of discharge  Outcome: Ongoing (interventions implemented as appropriate)  Goal: Complete daily ADLs, including personal hygiene independently, as able  Outcome: Ongoing (interventions implemented as appropriate)    Problem: Risk for Elopement/Wandering  Goal: Monitor Patient to prevent elopement/wandering  Outcome: Ongoing (interventions implemented as appropriate)    09/29/17 0941   OTHER   Monitor Patient to Prevent Elopement/Wandering Elopement precautions in place.         Problem: Pain, Acute (Adult)  Intervention: Mutually Develop/Implement Acute Pain Management Plan    10/02/17 0940   Cognitive Interventions   Sensory Stimulation Regulation music/television provided for relaxation;quiet environment promoted;other (see comments)  (groups provided )       Intervention: Support/Optimize Psychosocial Response to Acute Pain    10/01/17 1400 10/02/17 0940   Coping Strategies   Trust Relationship/Rapport --  care explained;choices provided;emotional support provided;empathic listening provided;questions answered;questions encouraged;reassurance provided;thoughts/feelings acknowledged   Family/Support System Care self-care encouraged;involvement promoted --    Supportive Measures --  active listening utilized;positive reinforcement provided;verbalization of feelings encouraged         Goal: Identify Related Risk Factors and Signs and Symptoms  Outcome: Ongoing (interventions implemented as appropriate)  Goal: Acceptable Pain Control/Comfort Level  Outcome: Ongoing (interventions implemented as appropriate)    10/02/17 1340   Pain, Acute (Adult)   Acceptable Pain Control/Comfort Level making progress toward outcome

## 2017-10-02 NOTE — PLAN OF CARE
Problem:  Patient Care Overview (Adult)  Goal: Plan of Care Review  Outcome: Ongoing (interventions implemented as appropriate)    10/02/17 0302   Coping/Psychosocial Response Interventions   Plan Of Care Reviewed With patient   Coping/Psychosocial   Patient Agreement with Plan of Care agrees   Patient Care Overview   Progress improving   Outcome Evaluation   Outcome Summary/Follow up Plan Pt continues to deny symptoms to this RN. Was adherent to HS medication regimen. Continues to show poor insight and display symptoms of hypomania (intrusiveness w/ peers/staff, restless, hyperverbal), although improiving . Will continue to monitor pt for safety.        Goal: Individualization and Mutuality  Outcome: Ongoing (interventions implemented as appropriate)    10/02/17 0302   Behavioral Health Screens   Patient Personal Strengths family/social support;positive vocational history;positive educational history         10/01/17 0306 10/02/17 0302   Behavioral Health Screens   Patient Personal Strengths --  family/social support;positive vocational history;positive educational history   Patient Vulnerabilities Pt is resistant to the idea of adhering to medications when outpatient  --          Problem:  Overarching Goals  Goal: Adheres to Safety Considerations for Self and Others  Intervention: Develop/maintain Individualized Safety Plan    10/02/17 0302   Safety   Safety Measures safety rounds completed;suicide check-in completed   Provide Emotional/Physical Safety   Suicidal Ideation no   Willingness to Contact Staff Member if Feeling Like Hurting Self yes   Mental Health Homicide Risk   Homicidal Ideation no   Willingness to Contact Staff Member if Feeling Like Hurting Others yes         Goal: Optimized Coping Skills in Response to Life Stressors  Intervention: Promote Effective Coping Strategies    10/02/17 0302   Coping Strategies   Supportive Measures verbalization of feelings encouraged;active listening  utilized;self-care encouraged

## 2017-10-02 NOTE — PROGRESS NOTES
"The patient is pleasant and cooperative today.  Her mood seems more stable and the lability of affect is significantly reduced.  She does complain of feeling \"a little groggy\" with reinitiation of medications.  I have again important the patient's to remain compliant with medications and we will have a family session today or early tomorrow to reinforce this.  She should be ready for discharge by tomorrow.  "

## 2017-10-03 VITALS
SYSTOLIC BLOOD PRESSURE: 100 MMHG | BODY MASS INDEX: 21.33 KG/M2 | TEMPERATURE: 97.3 F | OXYGEN SATURATION: 97 % | DIASTOLIC BLOOD PRESSURE: 66 MMHG | HEIGHT: 65 IN | WEIGHT: 128 LBS | HEART RATE: 91 BPM | RESPIRATION RATE: 16 BRPM

## 2017-10-03 RX ORDER — OLANZAPINE 20 MG/1
20 TABLET, ORALLY DISINTEGRATING ORAL NIGHTLY
Qty: 30 TABLET | Refills: 1 | Status: SHIPPED | OUTPATIENT
Start: 2017-10-03 | End: 2017-10-29

## 2017-10-03 RX ORDER — DIVALPROEX SODIUM 500 MG/1
1000 TABLET, EXTENDED RELEASE ORAL NIGHTLY
Qty: 52 TABLET | Refills: 0 | Status: SHIPPED | OUTPATIENT
Start: 2017-10-03 | End: 2017-10-03

## 2017-10-03 RX ORDER — DIPHENOXYLATE HYDROCHLORIDE AND ATROPINE SULFATE 2.5; .025 MG/1; MG/1
1 TABLET ORAL DAILY
Status: DISCONTINUED | OUTPATIENT
Start: 2017-10-03 | End: 2017-10-03 | Stop reason: HOSPADM

## 2017-10-03 RX ORDER — OLANZAPINE 20 MG/1
20 TABLET, ORALLY DISINTEGRATING ORAL NIGHTLY
Qty: 26 TABLET | Refills: 0 | Status: SHIPPED | OUTPATIENT
Start: 2017-10-03 | End: 2017-10-03

## 2017-10-03 RX ORDER — DIVALPROEX SODIUM 500 MG/1
1000 TABLET, EXTENDED RELEASE ORAL NIGHTLY
Qty: 60 TABLET | Refills: 1 | Status: SHIPPED | OUTPATIENT
Start: 2017-10-03 | End: 2017-10-29

## 2017-10-03 RX ORDER — FOLIC ACID 1 MG/1
1 TABLET ORAL DAILY
Status: DISCONTINUED | OUTPATIENT
Start: 2017-10-03 | End: 2017-10-03 | Stop reason: HOSPADM

## 2017-10-03 RX ORDER — FOLIC ACID 1 MG/1
1 TABLET ORAL DAILY
Qty: 30 TABLET | Refills: 1 | Status: SHIPPED | OUTPATIENT
Start: 2017-10-03 | End: 2021-09-15

## 2017-10-03 RX ADMIN — Medication 1 TABLET: at 12:26

## 2017-10-03 RX ADMIN — FOLIC ACID 1 MG: 1 TABLET ORAL at 12:26

## 2017-10-03 NOTE — SIGNIFICANT NOTE
"Met with pt, mother and father-in law for family session. Pt shared feeling good about pt's daughter living with in-laws and child's father in [place]. Pt also shared feeling grateful that parents could pay for daughter to go to  in [place]. Father-in-law was encouraging of pt voicing support and how pt could use supports in family for needs. Pt had response to mother's voicing support was to move away from mother in room, look away and change topic. When asked about this, pt shared of not wanting to see mother cry as mother cries often. Pt then justified asking for money from father and asked mother to purchase items for pt. When asked about follow-up care, pt shared desire to go to work although coverage had been arranged since \"they may need help\", go to a wedding and stated mother could stay with pt for a short time. After some discussion and encouragement to focus on taking meds and therapy, Pt agreed to come to IOP tomorrow, not plan on the wedding as mother revealed pt was not invited and allow mother to help pt.  Smiley helped to describe benefits of IOP. Father-in-law and mother voiced approval of pt using follow-up care and then start out pt therapy.  "

## 2017-10-03 NOTE — DISCHARGE SUMMARY
DATES OF ADMISSION: 9/28/2017-10/3/2017    REASON FOR ADMISSION: The patient is a 32-year-old white female admitted with recurrent symptoms of fercho following a period of medication noncompliance.    LABS:  Laboratory data on admission indicated the patient had been noncompliant with both Depakote and lithium, this was confirmed by the patient.    HOSPITAL COURSE:  The patient was admitted to the crisis management unit and initially placed on suicide precautions.  These were discontinued and the patient was able to promise safety on the date of initial evaluation by this physician.  The patient was noted to be exhibiting extreme lability of mood and initially stated that she would not comply with prescribed medications.  Fortunately, she did agree to restart Depakote and Zyprexa but declined reinitiation of lithium.  The patient was otherwise generally pleasant cooperative and showed rapid improvement with reinitiation of medications.  By 10 3 family session was held.  At that session the patient's therapist and family will reinforce the patient's the message that this physician has been providing since the date of admission i.e. the importance of compliance with medication and the potential consequences of lack of compliance including loss of career possible loss of child custody etc. discharge was ordered on 10/3/2017.    FINAL DIAGNOSIS:  Bipolar disorder most recent episode manic    DISPOSITION ON DISCHARGE:  The patient will follow under the care of this physician at [facility].  A listing of her medications is provided below.    PROGNOSIS: Alexus Vale   Home Medication Instructions PABLO:[id number]    Printed on:10/03/17 1007   Medication Information                      divalproex (DEPAKOTE) 500 MG 24 hr tablet  Take 2 tablets by mouth Every Night for 26 doses.             folic acid (FOLVITE) 1 MG tablet  Take 1 tablet by mouth Daily.             multivitamin (THERAGRAN)  tablet tablet  Take 1 tablet by mouth Daily.             OLANZapine zydis (zyPREXA) 20 MG disintegrating tablet  Take 1 tablet by mouth Every Night for 26 doses.

## 2017-10-03 NOTE — PLAN OF CARE
"Problem: BH Patient Care Overview (Adult)  Goal: Plan of Care Review  Outcome: Ongoing (interventions implemented as appropriate)    10/03/17 0439   Coping/Psychosocial Response Interventions   Plan Of Care Reviewed With patient   Coping/Psychosocial   Patient Agreement with Plan of Care agrees   Patient Care Overview   Progress improving   Outcome Evaluation   Outcome Summary/Follow up Plan Pt alert, oriented x4, pleasant and cooperative medication and care. Pt denies depression, she continues to state she feels \"antsy\" when asked about anxiety. Pt walks halls frequently while awake. Denies SI/HI and hallucinations. Will continue to monitor.          Problem: Alteration in Thoughts and Perception  Goal: Agree to be compliant with medication regime, as prescribed and report medication side effects  Outcome: Ongoing (interventions implemented as appropriate)    Problem: Pain, Acute (Adult)  Goal: Acceptable Pain Control/Comfort Level  Outcome: Ongoing (interventions implemented as appropriate)      "

## 2017-10-03 NOTE — PROGRESS NOTES
Continued Stay Note  [facility]     Patient Name: Alexus Fuentes  MRN: [id number]  Today's Date: 10/3/2017    Admit Date: 9/28/2017          Discharge Plan       10/03/17 1525    Final Note    Final Note Pt will be discharged per Dr. Aden's orders.  Pt has a follow-up appt w/Dr. Aden in his office at [facility], ph. [phone] on 10/17.  Pt will request a Therapist at that time.  After pt's family session, it was agreed that she would attend [facility] beginning tomorrow, 10/04.              Discharge Codes       10/03/17 1527    Discharge Codes    Discharge Codes 01  Discharge to home        Expected Discharge Date and Time     Expected Discharge Date Expected Discharge Time    Oct 3, 2017             TON Turner

## 2017-10-03 NOTE — PROGRESS NOTES
Continued Stay Note  [facility]     Patient Name: Alexus Fuentes  MRN: [id number]  Today's Date: 10/3/2017    Admit Date: 9/28/2017          Discharge Plan       10/03/17 1152    Case Management/Social Work Plan    Additional Comments SW called & spoke w/pt's mother, Jayla, ph. [phone] to discuss pt's treatment.  Pt's mother expressed concerns about pt being discharge.  SW informed her that it will be very important for pt to take her medications.  SW advised pt's mother to express her concerns in the family session today regarding pt's daughter & returning to work.              Discharge Codes     None        Expected Discharge Date and Time     Expected Discharge Date Expected Discharge Time    Oct 3, 2017             TON Turner

## 2017-10-04 ENCOUNTER — TELEPHONE (OUTPATIENT)
Dept: PSYCHIATRY | Facility: HOSPITAL | Age: 32
End: 2017-10-04

## 2017-10-04 ENCOUNTER — OFFICE VISIT (OUTPATIENT)
Dept: PSYCHIATRY | Facility: HOSPITAL | Age: 32
End: 2017-10-04

## 2017-10-04 DIAGNOSIS — F31.2 BIPOLAR AFFECTIVE DISORDER, MANIC, SEVERE, WITH PSYCHOTIC BEHAVIOR (HCC): Primary | ICD-10-CM

## 2017-10-04 PROCEDURE — S9480 INTENSIVE OUTPATIENT PSYCHIA: HCPCS | Performed by: COUNSELOR

## 2017-10-04 NOTE — PROGRESS NOTES
The patient begins the intensive outpatient program following a brief stay on the crisis management unit.  She states that she has felt her medications and plans to comply with them.  Her mood is euthymic today and she exhibits no evidence of emotional lability or fercho.  she states that her mother has returned to [facility].

## 2017-10-04 NOTE — PROGRESS NOTES
IOP  Group note   Observations:    Engaged in Activity / Process and Self -disclosed: Yes  Applies Topic to self: Yes  Able to give Constructive Feedback: Yes  Affect: anxious  Degree of Insightful Thinking 5  Notes:  New to the group.  Shared of bipolar dx and dislike of taking meds  But gaining insight into the need to do so.      Maggi Jiménez, [facility]

## 2017-10-04 NOTE — PROGRESS NOTES
Mental Health Awareness Class   ([id number])  Information/activity     Stress Management    Instructor Lara Regalado LCSW     11:12 AM     Patient Response Good participation  Patient stated she was having trouble sitting still.  Contributed to discussion but wanted everything written down so she could make notes.  Will give handouts.

## 2017-10-04 NOTE — PROGRESS NOTES
Wrap-up  Day 1 IOP from [facility]  Dealing with labile mood  Mood at 2  with 10 being the worse    Increases appetite since med adjustment  Decreased concentration  Anxiety  Easily distracted  NO SI  Goals for later today:  Run in the park and go to DBSA Support Group.

## 2017-10-05 ENCOUNTER — OFFICE VISIT (OUTPATIENT)
Dept: PSYCHIATRY | Facility: HOSPITAL | Age: 32
End: 2017-10-05

## 2017-10-05 DIAGNOSIS — F31.2 BIPOLAR AFFECTIVE DISORDER, MANIC, SEVERE, WITH PSYCHOTIC BEHAVIOR (HCC): Primary | ICD-10-CM

## 2017-10-05 PROCEDURE — S9480 INTENSIVE OUTPATIENT PSYCHIA: HCPCS | Performed by: COUNSELOR

## 2017-10-05 NOTE — PROGRESS NOTES
[facility]  Group note   Observations:    Engaged in Activity / Process and Self -disclosed: Yes  Applies Topic to self: Yes  Able to give Constructive Feedback: Yes  Affect: bright  Degree of Insightful Thinking 6  Notes:  Reported decided goals she set were too ambitious yesterday and adjusted goals to slow down, eat all three meals, and rest.  Reported feels much better today.  Able to give and receive feedback in group today.  Attentive to the process and much more alert.   No TOREY Jiménez, [facility]

## 2017-10-05 NOTE — PROGRESS NOTES
Wrap-up  Day 2 IOP  Increased appetite  NO SI  Good participation in classes and groups  Calmer today  Goals for later today:  Eat lunch, dinner and walk later today  Stated 10/6 would be last day d/t need to return to work on 10/9

## 2017-10-05 NOTE — PROGRESS NOTES
Other     Information/activity     Five Dimensions of Wholeness    Instructor Chemo Meyer     10:40 AM     Patient Response Good participation

## 2017-10-06 ENCOUNTER — OFFICE VISIT (OUTPATIENT)
Dept: PSYCHIATRY | Facility: HOSPITAL | Age: 32
End: 2017-10-06

## 2017-10-06 DIAGNOSIS — F31.2 BIPOLAR AFFECTIVE DISORDER, MANIC, SEVERE, WITH PSYCHOTIC BEHAVIOR (HCC): Primary | ICD-10-CM

## 2017-10-06 PROCEDURE — S9480 INTENSIVE OUTPATIENT PSYCHIA: HCPCS | Performed by: SOCIAL WORKER

## 2017-10-06 NOTE — PROGRESS NOTES
Other     Information/activity     [id number]  Art Therapy Bridge Drawing    Instructor Serafin Dhillon, VIANEY     12:57 PM     Patient Response Good participation

## 2017-10-06 NOTE — PROGRESS NOTES
Discharge note:  Patient started IOP on 10/4 after a brief stay on [facility].  She discharged today, 10/6 as she is to return to work on Monday, 10/9.  She has an appointment with Dr. Aden on 10/17/17 at 5pm for follow-up.  Her current meds:  Depakote 500 mg 24 hr tablet twice daily, Zyprexa 20mg disintegrating tablet.  Encouraged patient to return for the DBSA group on Wednesday nights.

## 2017-10-06 NOTE — PROGRESS NOTES
Wrap-up:  Patient rates her mood at 9 1/2 out of 10 (with 10 being the best), found the group process the most helpful.  She reported no symptoms and had a good plan for the rest of the day.

## 2017-10-06 NOTE — PROGRESS NOTES
Group note 10:00am-11:30am:  Patient shared about her discharge, concerns about going back to work and taking better care of self this time.  She was able to hear good feedback from the group.

## 2017-10-10 ENCOUNTER — APPOINTMENT (OUTPATIENT)
Dept: PSYCHIATRY | Facility: HOSPITAL | Age: 32
End: 2017-10-10

## 2017-10-11 ENCOUNTER — APPOINTMENT (OUTPATIENT)
Dept: PSYCHIATRY | Facility: HOSPITAL | Age: 32
End: 2017-10-11

## 2017-10-12 ENCOUNTER — APPOINTMENT (OUTPATIENT)
Dept: PSYCHIATRY | Facility: HOSPITAL | Age: 32
End: 2017-10-12

## 2017-10-13 ENCOUNTER — APPOINTMENT (OUTPATIENT)
Dept: PSYCHIATRY | Facility: HOSPITAL | Age: 32
End: 2017-10-13

## 2017-10-16 ENCOUNTER — APPOINTMENT (OUTPATIENT)
Dept: PSYCHIATRY | Facility: HOSPITAL | Age: 32
End: 2017-10-16

## 2017-10-17 ENCOUNTER — APPOINTMENT (OUTPATIENT)
Dept: PSYCHIATRY | Facility: HOSPITAL | Age: 32
End: 2017-10-17

## 2017-10-18 ENCOUNTER — APPOINTMENT (OUTPATIENT)
Dept: PSYCHIATRY | Facility: HOSPITAL | Age: 32
End: 2017-10-18

## 2017-10-19 ENCOUNTER — APPOINTMENT (OUTPATIENT)
Dept: PSYCHIATRY | Facility: HOSPITAL | Age: 32
End: 2017-10-19

## 2017-10-20 ENCOUNTER — APPOINTMENT (OUTPATIENT)
Dept: PSYCHIATRY | Facility: HOSPITAL | Age: 32
End: 2017-10-20

## 2017-10-23 ENCOUNTER — APPOINTMENT (OUTPATIENT)
Dept: PSYCHIATRY | Facility: HOSPITAL | Age: 32
End: 2017-10-23

## 2017-10-24 ENCOUNTER — APPOINTMENT (OUTPATIENT)
Dept: PSYCHIATRY | Facility: HOSPITAL | Age: 32
End: 2017-10-24

## 2017-10-25 ENCOUNTER — APPOINTMENT (OUTPATIENT)
Dept: PSYCHIATRY | Facility: HOSPITAL | Age: 32
End: 2017-10-25

## 2017-10-26 ENCOUNTER — APPOINTMENT (OUTPATIENT)
Dept: PSYCHIATRY | Facility: HOSPITAL | Age: 32
End: 2017-10-26

## 2017-10-27 ENCOUNTER — APPOINTMENT (OUTPATIENT)
Dept: PSYCHIATRY | Facility: HOSPITAL | Age: 32
End: 2017-10-27

## 2017-10-30 ENCOUNTER — APPOINTMENT (OUTPATIENT)
Dept: PSYCHIATRY | Facility: HOSPITAL | Age: 32
End: 2017-10-30

## 2017-10-31 ENCOUNTER — APPOINTMENT (OUTPATIENT)
Dept: PSYCHIATRY | Facility: HOSPITAL | Age: 32
End: 2017-10-31

## 2021-03-12 ENCOUNTER — HOSPITAL ENCOUNTER (OUTPATIENT)
Dept: OTHER | Facility: HOSPITAL | Age: 36
Discharge: HOME | End: 2021-03-12
Attending: NURSE PRACTITIONER

## 2021-03-12 ENCOUNTER — OFFICE VISIT CONVERTED (OUTPATIENT)
Dept: FAMILY MEDICINE CLINIC | Age: 36
End: 2021-03-12
Attending: NURSE PRACTITIONER

## 2021-03-12 LAB
ALBUMIN SERPL-MCNC: 3.7 G/DL (ref 3.5–5)
ALBUMIN/GLOB SERPL: 1.3 {RATIO} (ref 1.4–2.6)
ALP SERPL-CCNC: 39 U/L (ref 42–98)
ALT SERPL-CCNC: 8 U/L (ref 10–40)
ANION GAP SERPL CALC-SCNC: 11 MMOL/L (ref 8–19)
AST SERPL-CCNC: 14 U/L (ref 15–50)
BASOPHILS # BLD MANUAL: 0.07 10*3/UL (ref 0–0.2)
BASOPHILS NFR BLD MANUAL: 0.8 % (ref 0–3)
BILIRUB SERPL-MCNC: 0.28 MG/DL (ref 0.2–1.3)
BUN SERPL-MCNC: 16 MG/DL (ref 5–25)
BUN/CREAT SERPL: 15 {RATIO} (ref 6–20)
CALCIUM SERPL-MCNC: 8.6 MG/DL (ref 8.7–10.4)
CHLORIDE SERPL-SCNC: 103 MMOL/L (ref 99–111)
CONV CO2: 26 MMOL/L (ref 22–32)
CONV TOTAL PROTEIN: 6.6 G/DL (ref 6.3–8.2)
CREAT UR-MCNC: 1.04 MG/DL (ref 0.5–0.9)
DEPRECATED RDW RBC AUTO: 40.2 FL
EOSINOPHIL # BLD MANUAL: 0.19 10*3/UL (ref 0–0.7)
EOSINOPHIL NFR BLD MANUAL: 2.2 % (ref 0–7)
ERYTHROCYTE [DISTWIDTH] IN BLOOD BY AUTOMATED COUNT: 11.6 % (ref 11.5–14.5)
GFR SERPLBLD BASED ON 1.73 SQ M-ARVRAT: >60 ML/MIN/{1.73_M2}
GLOBULIN UR ELPH-MCNC: 2.9 G/DL (ref 2–3.5)
GLUCOSE SERPL-MCNC: 83 MG/DL (ref 65–99)
GRANS (ABSOLUTE): 5.12 10*3/UL (ref 2–8)
GRANS: 60 % (ref 30–85)
HBA1C MFR BLD: 13 G/DL (ref 12–16)
HCT VFR BLD AUTO: 38.6 % (ref 37–47)
IMM GRANULOCYTES # BLD: 0.01 10*3/UL (ref 0–0.54)
IMM GRANULOCYTES NFR BLD: 0.1 % (ref 0–0.43)
LYMPHOCYTES # BLD MANUAL: 2.71 10*3/UL (ref 1–5)
LYMPHOCYTES NFR BLD MANUAL: 5.2 % (ref 3–10)
MCH RBC QN AUTO: 31.6 PG (ref 27–31)
MCHC RBC AUTO-ENTMCNC: 33.7 G/DL (ref 33–37)
MCV RBC AUTO: 93.9 FL (ref 81–99)
MONOCYTES # BLD AUTO: 0.44 10*3/UL (ref 0.2–1.2)
OSMOLALITY SERPL CALC.SUM OF ELEC: 282 MOSM/KG (ref 273–304)
PLATELET # BLD AUTO: 259 10*3/UL (ref 130–400)
PMV BLD AUTO: 10 FL (ref 7.4–10.4)
POTASSIUM SERPL-SCNC: 4.1 MMOL/L (ref 3.5–5.3)
RBC # BLD AUTO: 4.11 10*6/UL (ref 4.2–5.4)
SODIUM SERPL-SCNC: 136 MMOL/L (ref 135–147)
T4 FREE SERPL-MCNC: 1.3 NG/DL (ref 0.9–1.8)
TSH SERPL-ACNC: 4.34 M[IU]/L (ref 0.27–4.2)
VARIANT LYMPHS NFR BLD MANUAL: 31.7 % (ref 20–45)
VIT B12 SERPL-MCNC: 618 PG/ML (ref 211–911)
WBC # BLD AUTO: 8.54 10*3/UL (ref 4.8–10.8)

## 2021-05-11 ENCOUNTER — OFFICE VISIT CONVERTED (OUTPATIENT)
Dept: FAMILY MEDICINE CLINIC | Age: 36
End: 2021-05-11
Attending: NURSE PRACTITIONER

## 2021-05-11 ENCOUNTER — HOSPITAL ENCOUNTER (OUTPATIENT)
Dept: OTHER | Facility: HOSPITAL | Age: 36
Discharge: HOME | End: 2021-05-11
Attending: NURSE PRACTITIONER

## 2021-05-11 LAB
T4 FREE SERPL-MCNC: 1.3 NG/DL (ref 0.9–1.8)
TSH SERPL-ACNC: 5.55 M[IU]/L (ref 0.27–4.2)

## 2021-05-12 LAB — CONV ANTI MICROSOMAL AB: 25 IU/ML (ref 0–34)

## 2021-05-18 NOTE — PROGRESS NOTES
Alexus Fuentes  1985     Office/Outpatient Visit    Visit Date: Tue, May 11, 2021 08:20 am    Provider: Carina Ribeiro N.P. (Assistant: Dona Yanes,  )    Location: [facility]        Electronically signed by Carina Ribeiro N.P. on  05/11/2021 07:30:11 PM                             Subjective:        CC: Ms. Fuentes is a 35 year old female.  This is a follow-up visit.          HPI:           Patient to be evaluated for dysthymic disorder.  improved since restarting fluoxetine.   fatigue does persist and is here to follow up regarding mildly elevated tsh at last visit.  due for recheck.  she does work night shift one week then day shift the following week on rotating schedule.  reports cold intolerance .  lmp 2 wks ago.        see  HPI above    ROS:     CONSTITUTIONAL:  Positive for fatigue ( mild ).   Negative for chills or fever.      CARDIOVASCULAR:  Negative for chest pain, palpitations, tachycardia, orthopnea, and edema.      RESPIRATORY:  Negative for cough, dyspnea, and hemoptysis.      GENITOURINARY:  Negative for irregular menstrual cycle.      ENDOCRINE:  Positive for temperature intolerances ( cold intolerance ).      PSYCHIATRIC:  Positive for anxiety ( (improved) ).   Negative for suicidal thoughts.          Past Medical History / Family History / Social History:         Last Reviewed on 5/11/2021 08:42 AM by Carina Ribeiro    Past Medical History:                 PAST MEDICAL HISTORY         Depression    Anxiety Hospitalizations: depression, biploar disorder, 2017         PREVENTIVE HEALTH MAINTENANCE             EYE EXAM: was last done 2019 glasses     PAP SMEAR: was last done 2019 with normal results         Surgical History:     NONE         Family History:         Positive for Hyperlipidemia ( father ) and Hypertension.      Positive for Lung Cancer ( mat. GM -- smoker ).          Social History:     Occupation: pharmacist     Marital Status:      Children: 1  child         Tobacco/Alcohol/Supplements:     Last Reviewed on 5/11/2021 08:23 AM by Dona Yanes    Tobacco: She has never smoked.          Current Problems:     Last Reviewed on 5/11/2021 08:42 AM by Carina Ribeiro    Dysthymic disorder    Other fatigue    Encounter for screening for depression    Abnormal finding of blood chemistry, unspecified        Immunizations:     None        Allergies:     Last Reviewed on 3/12/2021 09:40 AM by Eleonora Ya    No Known Allergies.        Current Medications:     Last Reviewed on 5/11/2021 08:23 AM by Dona Yanes    FLUoxetine 20 mg oral capsule [TAKE 3 CAPSULES BY MOUTH IN THE MORNING AS DIRECTED]    LORYNA       TAB 3-0.02MG         Objective:        Vitals:         Current: 5/11/2021 8:22:42 AM    Ht:  5 ft, 5.5 in;  Wt: 134.2 lbs;  BMI: 22.0T: 97.3 F (temporal);  BP: 112/76 mm Hg (left arm, sitting);  P: 67 bpm (left arm (BP Cuff), sitting);  sCr: 1.04 mg/dL;  GFR: 72.90        Exams:     PHYSICAL EXAM:     GENERAL:  well developed and nourished; appropriately groomed; in no apparent distress;     NECK: thyroid is non-palpable;     RESPIRATORY: normal respiratory rate and pattern with no distress; normal breath sounds with no rales, rhonchi, wheezes or rubs;     CARDIOVASCULAR: normal rate; rhythm is regular;     MUSCULOSKELETAL:  Normal range of motion, strength and tone;     NEUROLOGIC: mental status: alert and oriented x 3; GROSSLY INTACT     PSYCHIATRIC:  appropriate affect and demeanor; normal speech pattern; grossly normal memory;         Assessment:         F34.1   Dysthymic disorder       R79.9   Abnormal finding of blood chemistry, unspecified           ORDERS:         Lab Orders:       90095  TSH - [facility] TSH  (Send-Out)            [id number]  AMSA - [facility] Microsomal Antibodies  (Send-Out)                      Plan:         Dysthymic disorder        RECOMMENDATIONS given include: continue current tx.  further tx pending results of today's labs..  MIPS Vaccines  Flu and Pneumonia updated in Shot record         Abnormal finding of blood chemistry, unspecified    LABORATORY:  Labs ordered to be performed today include TSH.            Orders:       [id number]  TSH - [facility] TSH  (Send-Out)            [id number]  AMSA - [facility] Microsomal Antibodies  (Send-Out)                  Charge Capture:         Primary Diagnosis:     F34.1  Dysthymic disorder           Orders:      51894  Office/outpatient visit; established patient, level 3  (In-House)              R79.9  Abnormal finding of blood chemistry, unspecified

## 2021-05-18 NOTE — PROGRESS NOTES
Alexus Fuentes  1985     Office/Outpatient Visit    Visit Date: Fri, Mar 12, 2021 09:36 am    Provider: Carina Ribeiro N.P. (Assistant: Eleonora Ya MA)    Location: [facility]        Electronically signed by Carina Ribeiro N.P. on  03/12/2021 03:39:27 PM                             Subjective:        CC: Ms. Fuentes is a 35 year old female.  This is her first visit to the clinic.  [facility], med refill;         HPI: lmp 2 wks ago-  started bcp 2 months ago per online pharmacy telehealth      zoloft, wellbutrin , abilify, buspar, propranolol, hydroxyzine, paxil, and gabapentin ineffective previously.    Patient to be evaluated for dysthymic disorder.  The diagnosis of depression was made 10 plus years ago.  Currently not on any antidepressants.  Current affective symptoms include anhedonia, anxious mood and fatigue.  The symptoms are frequent, and present most days.  Presently, Ms. Fuentes denies suicidal ideation.  has done best with use of fluoxetine 20 mg -  3 capsules daily.  most recently rx by psychiatrist dr pat slaughter.  ran out of medication and feels that she needs to restart medication.  she does not feel as if she needs to continue with psychiatrist.  was going thru a lot including a divorce when she was hospitalized 4 yrs ago for depression.  was dx with bipolar at that time.            Ms. Fuentes complains of fatigue.  This has been a problem for 4 months.  There are no problems with initiation or maintenance of sleep.  Patient states that she is depressed. She denies contemplating suicide.  Associated symptoms include anxiety.            PHQ-9 Depression Screening: Completed form scanned and in chart; Total Score 15     ROS:     CONSTITUTIONAL:  Positive for fatigue.   Negative for chills or fever.      CARDIOVASCULAR:  Negative for chest pain, palpitations, tachycardia, orthopnea, and edema.      RESPIRATORY:  Negative for cough, dyspnea, and hemoptysis.       MUSCULOSKELETAL:  Negative for arthralgias, back pain, and myalgias.      NEUROLOGICAL:  Negative for dizziness, headaches, paresthesias, and weakness.      PSYCHIATRIC:  Positive for anxiety, depression and feelings of stress.   Negative for mood swings, sleep disturbance or suicidal thoughts.          Past Medical History / Family History / Social History:         Last Reviewed on 3/12/2021 10:11 AM by Carina Ribeiro    Past Medical History:                 PAST MEDICAL HISTORY         Depression    Anxiety         PREVENTIVE HEALTH MAINTENANCE             EYE EXAM: was last done 2019 glasses     PAP SMEAR: was last done 2019 with normal results         Surgical History:     NONE         Family History:         Positive for Hyperlipidemia ( father ) and Hypertension.      Positive for Lung Cancer ( mat. GM -- smoker ).          Social History:     Occupation: pharmacist     Marital Status:      Children: 1 child         Tobacco/Alcohol/Supplements:     Last Reviewed on 3/12/2021 09:40 AM by Eleonora Ya    Tobacco: She has never smoked.          Current Problems:     None Recorded        Immunizations:     None        Allergies:     Last Reviewed on 3/12/2021 09:40 AM by Eleonora Ya    No Known Allergies.        Current Medications:     Last Reviewed on 3/12/2021 09:40 AM by Eleonora Ya    FLUOXETINE 20MG     CAP [TAKE 3 CAPSULES BY MOUTH IN THE MORNING AS DIRECTED]    LORYNA       TAB 3-0.02MG        Objective:        Vitals:         Current: 3/12/2021 9:41:42 AM    Ht:  5 ft, 5.5 in;  Wt: 139 lbs;  BMI: 22.8T: 97.2 F (temporal);  BP: 115/72 mm Hg (left arm, sitting);  P: 74 bpm (left arm (BP Cuff), sitting)        Exams:     PHYSICAL EXAM:     GENERAL:  well developed and nourished; appropriately groomed; in no apparent distress;     NECK: thyroid is non-palpable;     RESPIRATORY: normal respiratory rate and pattern with no distress; normal breath sounds with no rales, rhonchi, wheezes or rubs;      CARDIOVASCULAR: normal rate; rhythm is regular;     MUSCULOSKELETAL:  Normal range of motion, strength and tone;     NEUROLOGIC: mental status: alert and oriented x 3; GROSSLY INTACT     PSYCHIATRIC:  appropriate affect and demeanor; normal speech pattern; grossly normal memory;         Assessment:         F34.1   Dysthymic disorder       R53.83   Other fatigue       Z13.31   Encounter for screening for depression           ORDERS:         Meds Prescribed:       [Refilled] FLUoxetine 20 mg oral capsule [TAKE 3 CAPSULES BY MOUTH IN THE MORNING AS DIRECTED], #90 (ninety) capsules, Refills: 5 (five)         [facility] Orders:       [id number], CMP, TSH, B12 levels FATIGUE PANEL  (Send-Out)              Other Orders:         Depression screen positive and follow up plan documented  (In-House)                      Plan:         Dysthymic disorder        RECOMMENDATIONS given include: avoidance of caffeine, stress reduction, and restart fluoxetine.  if she were to have bipolar disorder she would need referral to mental health specialist and addition of mood stabilizer.  advise counseling.  follow up if not improving..            Prescriptions:       [Refilled] FLUoxetine 20 mg oral capsule [TAKE 3 CAPSULES BY MOUTH IN THE MORNING AS DIRECTED], #90 (ninety) capsules, Refills: 5 (five)         Other fatigue    LABORATORY:  Labs ordered to be performed today include Female Fatigue Panel (CBC, CMP, TSH, B12).            Orders:       [id number]  [facility] CBC, CMP, TSH, B12 levels FATIGUE PANEL  (Send-Out)              Encounter for screening for depression    MIPS PHQ-9 Depression Screening: Completed form scanned and in chart; Total Score 15 Positive Depression Screen: Suicide Risk Assessment completed--denies suicidal/homicidal ideation; Pharmacologic intervention initiated/modified           Orders:         Depression screen positive and follow up plan documented  (In-House)                  Patient  Recommendations:        For  Dysthymic disorder:    Try to avoid or reduce the amount of caffeine intake. Try stress reduction methods to reduce the frequency or lessen the severity of anxiety episodes.              Charge Capture:         Primary Diagnosis:     F34.1  Dysthymic disorder           Orders:      22940  Office visit - new pt, level 2  (In-House)              R53.83  Other fatigue     Z13.31  Encounter for screening for depression           Orders:        Depression screen positive and follow up plan documented  (In-House)                  ADDENDUMS:      ____________________________________    Addendum: 03/16/2021 09:44 PM - Carina Ribeiro        add [id number]    remove [id number]. lj

## 2021-07-02 VITALS
BODY MASS INDEX: 22.34 KG/M2 | HEART RATE: 74 BPM | SYSTOLIC BLOOD PRESSURE: 115 MMHG | DIASTOLIC BLOOD PRESSURE: 72 MMHG | WEIGHT: 139 LBS | HEIGHT: 66 IN | TEMPERATURE: 97.2 F

## 2021-07-02 VITALS
WEIGHT: 134.2 LBS | HEART RATE: 67 BPM | HEIGHT: 66 IN | BODY MASS INDEX: 21.57 KG/M2 | TEMPERATURE: 97.3 F | DIASTOLIC BLOOD PRESSURE: 76 MMHG | SYSTOLIC BLOOD PRESSURE: 112 MMHG

## 2021-09-01 ENCOUNTER — TELEPHONE (OUTPATIENT)
Dept: FAMILY MEDICINE CLINIC | Age: 36
End: 2021-09-01

## 2021-09-15 ENCOUNTER — LAB (OUTPATIENT)
Dept: LAB | Facility: HOSPITAL | Age: 36
End: 2021-09-15

## 2021-09-15 ENCOUNTER — OFFICE VISIT (OUTPATIENT)
Dept: FAMILY MEDICINE CLINIC | Age: 36
End: 2021-09-15

## 2021-09-15 VITALS
WEIGHT: 137.6 LBS | TEMPERATURE: 98.6 F | HEART RATE: 75 BPM | HEIGHT: 66 IN | BODY MASS INDEX: 22.11 KG/M2 | DIASTOLIC BLOOD PRESSURE: 65 MMHG | SYSTOLIC BLOOD PRESSURE: 109 MMHG

## 2021-09-15 DIAGNOSIS — M79.606 PAIN OF LOWER EXTREMITY, UNSPECIFIED LATERALITY: ICD-10-CM

## 2021-09-15 DIAGNOSIS — F32.A DEPRESSION, UNSPECIFIED DEPRESSION TYPE: ICD-10-CM

## 2021-09-15 DIAGNOSIS — E03.9 ACQUIRED HYPOTHYROIDISM: Primary | ICD-10-CM

## 2021-09-15 PROBLEM — N10 ACUTE PYELONEPHRITIS: Status: RESOLVED | Noted: 2017-07-27 | Resolved: 2021-09-15

## 2021-09-15 PROBLEM — E87.6 HYPOKALEMIA: Status: RESOLVED | Noted: 2017-09-28 | Resolved: 2021-09-15

## 2021-09-15 PROBLEM — F31.2 BIPOLAR AFFECTIVE DISORDER, MANIC, SEVERE, WITH PSYCHOTIC BEHAVIOR: Status: RESOLVED | Noted: 2017-09-28 | Resolved: 2021-09-15

## 2021-09-15 PROBLEM — A41.51 ESCHERICHIA COLI SEPSIS: Status: RESOLVED | Noted: 2017-07-29 | Resolved: 2021-09-15

## 2021-09-15 PROBLEM — N28.9 RENAL INSUFFICIENCY: Status: RESOLVED | Noted: 2017-09-28 | Resolved: 2021-09-15

## 2021-09-15 PROBLEM — E87.29 HIGH ANION GAP METABOLIC ACIDOSIS: Status: RESOLVED | Noted: 2017-09-29 | Resolved: 2021-09-15

## 2021-09-15 LAB
25(OH)D3 SERPL-MCNC: 35.1 NG/ML
TSH SERPL DL<=0.05 MIU/L-ACNC: 4.95 UIU/ML (ref 0.27–4.2)

## 2021-09-15 PROCEDURE — 36415 COLL VENOUS BLD VENIPUNCTURE: CPT | Performed by: NURSE PRACTITIONER

## 2021-09-15 PROCEDURE — 84443 ASSAY THYROID STIM HORMONE: CPT | Performed by: NURSE PRACTITIONER

## 2021-09-15 PROCEDURE — 99213 OFFICE O/P EST LOW 20 MIN: CPT | Performed by: NURSE PRACTITIONER

## 2021-09-15 PROCEDURE — 82306 VITAMIN D 25 HYDROXY: CPT

## 2021-09-15 RX ORDER — LEVOTHYROXINE SODIUM 25 UG/1
25 TABLET ORAL DAILY
COMMUNITY
Start: 2021-07-19 | End: 2021-09-21 | Stop reason: DRUGHIGH

## 2021-09-15 RX ORDER — DROSPIRENONE AND ETHINYL ESTRADIOL 0.02-3(28)
1 KIT ORAL DAILY
COMMUNITY
End: 2022-08-09

## 2021-09-15 RX ORDER — FLUOXETINE HYDROCHLORIDE 20 MG/1
60 CAPSULE ORAL DAILY
COMMUNITY
Start: 2021-06-22 | End: 2021-09-15 | Stop reason: SDUPTHER

## 2021-09-15 RX ORDER — FLUOXETINE HYDROCHLORIDE 20 MG/1
60 CAPSULE ORAL DAILY
Qty: 90 CAPSULE | Refills: 5 | Status: SHIPPED | OUTPATIENT
Start: 2021-09-15 | End: 2022-08-09 | Stop reason: SDUPTHER

## 2021-09-15 NOTE — PROGRESS NOTES
"Chief Complaint  Hypothyroidism    Subjective  Alexus is a 36-year-old female here today for follow-up on acquired hypothyroidism.  She was started on Euthyrox 25 mcg once daily after her visit here in May and she had to consecutive elevated TSH levels and fatigue.  Antithyroid antibodies were negative.  She does not feel as if her fatigue has improved on Euthyrox.  Last menstrual cycle is current and she had a [facility] 2 [facility] ago [facility] that was normal.  Does get some intermittent achy leg pain without any injury and symptoms are mild.  She has never had her vitamin D level checked.  Regarding depression she states symptoms are stable on fluoxetine 20 mg capsules 3 capsules once daily.  Denies side effects and requests refills.        Alexus Fuentes presents to [facility]    Review of Systems   Constitutional: Positive for fatigue. Negative for chills and fever.   Respiratory: Negative.    Cardiovascular: Negative.    Musculoskeletal: Negative.         Intermittent leg aches mild   Neurological: Negative.    Psychiatric/Behavioral:        Depression stable        Objective   Vital Signs:   Vitals:    09/15/21 0800   BP: 109/65   BP Location: Left arm   Patient Position: Sitting   Pulse: 75   Temp: 98.6 °F (37 °C)   TempSrc: Oral   Weight: 62.4 kg (137 lb 9.6 oz)   Height: 166.4 cm (65.5\")      Physical Exam  Vitals reviewed.   Constitutional:       General: She is not in acute distress.     Appearance: Normal appearance. She is well-developed.   Neck:      Thyroid: No thyroid mass, thyromegaly or thyroid tenderness.   Cardiovascular:      Rate and Rhythm: Normal rate and regular rhythm.      Heart sounds: Normal heart sounds.   Pulmonary:      Effort: Pulmonary effort is normal.      Breath sounds: Normal breath sounds.   Musculoskeletal:      Right lower leg: No edema.      Left lower leg: No edema.   Skin:     General: Skin is warm and dry.   Neurological:      General: No " focal deficit present.      Mental Status: She is alert.   Psychiatric:         Attention and Perception: Attention normal.         Mood and Affect: Mood and affect normal.         Behavior: Behavior normal.          Result Review :                Assessment and Plan    Diagnoses and all orders for this visit:    1. Acquired hypothyroidism (Primary)  Assessment & Plan:  Fatigue is unchanged.  Recheck TSH level.  We will continue Euthyrox if TSH level is normal.  Otherwise may consider discontinuation of medication or medication dosage adjustment.    Orders:  -     TSH    2. Pain of lower extremity, unspecified laterality  -     Vitamin D 25 hydroxy; Future    3. Depression, unspecified depression type  Assessment & Plan:  Symptoms are stable.  Continue current treatment follow-up in 6 months or sooner if concerns    Orders:  -     FLUoxetine (PROzac) 20 MG capsule; Take 3 capsules by mouth Daily.  Dispense: 90 capsule; Refill: 5      Follow Up    Return in about 6 months (around 3/15/2022).  Patient was given instructions and counseling regarding her condition or for health maintenance advice. Please see specific information pulled into the AVS if appropriate.

## 2021-09-15 NOTE — ASSESSMENT & PLAN NOTE
Fatigue is unchanged.  Recheck TSH level.  We will continue Euthyrox if TSH level is normal.  Otherwise may consider discontinuation of medication or medication dosage adjustment.

## 2021-09-20 NOTE — PROGRESS NOTES
Euthyrox 25 mcg daily is not strong enough.  Need to increase euthyrox to 50 mcg once daily.  Recheck tsh level is 3 months.

## 2021-09-21 DIAGNOSIS — E03.9 ACQUIRED HYPOTHYROIDISM: Primary | ICD-10-CM

## 2021-09-21 RX ORDER — LEVOTHYROXINE SODIUM 0.05 MG/1
50 TABLET ORAL DAILY
Qty: 90 TABLET | Refills: 0 | Status: SHIPPED | OUTPATIENT
Start: 2021-09-21 | End: 2021-12-28

## 2021-12-22 NOTE — TELEPHONE ENCOUNTER
Spoke with patient, states she will come in for lab work, needs to get work schedule before she can schedule 6 month follow up in February.

## 2021-12-28 ENCOUNTER — LAB (OUTPATIENT)
Dept: LAB | Facility: HOSPITAL | Age: 36
End: 2021-12-28

## 2021-12-28 DIAGNOSIS — E03.9 ACQUIRED HYPOTHYROIDISM: ICD-10-CM

## 2021-12-28 LAB — TSH SERPL DL<=0.05 MIU/L-ACNC: 4.17 UIU/ML (ref 0.27–4.2)

## 2021-12-28 PROCEDURE — 36415 COLL VENOUS BLD VENIPUNCTURE: CPT

## 2021-12-28 PROCEDURE — 84443 ASSAY THYROID STIM HORMONE: CPT

## 2021-12-28 RX ORDER — LEVOTHYROXINE SODIUM 0.05 MG/1
TABLET ORAL
Qty: 14 TABLET | Refills: 0 | Status: SHIPPED | OUTPATIENT
Start: 2021-12-28 | End: 2021-12-29 | Stop reason: SDUPTHER

## 2021-12-29 RX ORDER — LEVOTHYROXINE SODIUM 0.05 MG/1
50 TABLET ORAL DAILY
Qty: 30 TABLET | Refills: 6 | Status: SHIPPED | OUTPATIENT
Start: 2021-12-29 | End: 2022-08-10 | Stop reason: DRUGHIGH

## 2022-08-09 ENCOUNTER — LAB (OUTPATIENT)
Dept: LAB | Facility: HOSPITAL | Age: 37
End: 2022-08-09

## 2022-08-09 ENCOUNTER — OFFICE VISIT (OUTPATIENT)
Dept: FAMILY MEDICINE CLINIC | Age: 37
End: 2022-08-09

## 2022-08-09 VITALS
DIASTOLIC BLOOD PRESSURE: 69 MMHG | HEIGHT: 66 IN | OXYGEN SATURATION: 100 % | WEIGHT: 131 LBS | SYSTOLIC BLOOD PRESSURE: 113 MMHG | BODY MASS INDEX: 21.05 KG/M2 | HEART RATE: 60 BPM

## 2022-08-09 DIAGNOSIS — E03.9 ACQUIRED HYPOTHYROIDISM: ICD-10-CM

## 2022-08-09 DIAGNOSIS — E83.51 HYPOCALCEMIA: ICD-10-CM

## 2022-08-09 DIAGNOSIS — R53.83 OTHER FATIGUE: ICD-10-CM

## 2022-08-09 DIAGNOSIS — F32.A DEPRESSION, UNSPECIFIED DEPRESSION TYPE: ICD-10-CM

## 2022-08-09 DIAGNOSIS — R53.83 OTHER FATIGUE: Primary | ICD-10-CM

## 2022-08-09 PROBLEM — M79.606 PAIN OF LOWER EXTREMITY: Status: RESOLVED | Noted: 2021-09-15 | Resolved: 2022-08-09

## 2022-08-09 LAB
ALBUMIN SERPL-MCNC: 3.9 G/DL (ref 3.5–5.2)
ALBUMIN/GLOB SERPL: 1.6 G/DL
ALP SERPL-CCNC: 58 U/L (ref 39–117)
ALT SERPL W P-5'-P-CCNC: 13 U/L (ref 1–33)
ANION GAP SERPL CALCULATED.3IONS-SCNC: 10.6 MMOL/L (ref 5–15)
AST SERPL-CCNC: 19 U/L (ref 1–32)
BASOPHILS # BLD AUTO: 0.08 10*3/MM3 (ref 0–0.2)
BASOPHILS NFR BLD AUTO: 1 % (ref 0–1.5)
BILIRUB SERPL-MCNC: <0.2 MG/DL (ref 0–1.2)
BUN SERPL-MCNC: 15 MG/DL (ref 6–20)
BUN/CREAT SERPL: 14.9 (ref 7–25)
CALCIUM SPEC-SCNC: 8.3 MG/DL (ref 8.6–10.5)
CHLORIDE SERPL-SCNC: 103 MMOL/L (ref 98–107)
CO2 SERPL-SCNC: 26.4 MMOL/L (ref 22–29)
CREAT SERPL-MCNC: 1.01 MG/DL (ref 0.57–1)
DEPRECATED RDW RBC AUTO: 43.1 FL (ref 37–54)
EGFRCR SERPLBLD CKD-EPI 2021: 74.6 ML/MIN/1.73
EOSINOPHIL # BLD AUTO: 0.24 10*3/MM3 (ref 0–0.4)
EOSINOPHIL NFR BLD AUTO: 3 % (ref 0.3–6.2)
ERYTHROCYTE [DISTWIDTH] IN BLOOD BY AUTOMATED COUNT: 12 % (ref 12.3–15.4)
GLOBULIN UR ELPH-MCNC: 2.4 GM/DL
GLUCOSE SERPL-MCNC: 85 MG/DL (ref 65–99)
HCT VFR BLD AUTO: 38 % (ref 34–46.6)
HGB BLD-MCNC: 12.1 G/DL (ref 12–15.9)
IMM GRANULOCYTES # BLD AUTO: 0.01 10*3/MM3 (ref 0–0.05)
IMM GRANULOCYTES NFR BLD AUTO: 0.1 % (ref 0–0.5)
LYMPHOCYTES # BLD AUTO: 3.58 10*3/MM3 (ref 0.7–3.1)
LYMPHOCYTES NFR BLD AUTO: 44.4 % (ref 19.6–45.3)
MCH RBC QN AUTO: 30.9 PG (ref 26.6–33)
MCHC RBC AUTO-ENTMCNC: 31.8 G/DL (ref 31.5–35.7)
MCV RBC AUTO: 96.9 FL (ref 79–97)
MONOCYTES # BLD AUTO: 0.49 10*3/MM3 (ref 0.1–0.9)
MONOCYTES NFR BLD AUTO: 6.1 % (ref 5–12)
NEUTROPHILS NFR BLD AUTO: 3.67 10*3/MM3 (ref 1.7–7)
NEUTROPHILS NFR BLD AUTO: 45.4 % (ref 42.7–76)
PLATELET # BLD AUTO: 262 10*3/MM3 (ref 140–450)
PMV BLD AUTO: 9.8 FL (ref 6–12)
POTASSIUM SERPL-SCNC: 3.9 MMOL/L (ref 3.5–5.2)
PROT SERPL-MCNC: 6.3 G/DL (ref 6–8.5)
RBC # BLD AUTO: 3.92 10*6/MM3 (ref 3.77–5.28)
SODIUM SERPL-SCNC: 140 MMOL/L (ref 136–145)
WBC NRBC COR # BLD: 8.07 10*3/MM3 (ref 3.4–10.8)

## 2022-08-09 PROCEDURE — 85025 COMPLETE CBC W/AUTO DIFF WBC: CPT

## 2022-08-09 PROCEDURE — 36415 COLL VENOUS BLD VENIPUNCTURE: CPT

## 2022-08-09 PROCEDURE — 83970 ASSAY OF PARATHORMONE: CPT

## 2022-08-09 PROCEDURE — 82550 ASSAY OF CK (CPK): CPT

## 2022-08-09 PROCEDURE — 80053 COMPREHEN METABOLIC PANEL: CPT

## 2022-08-09 PROCEDURE — 99213 OFFICE O/P EST LOW 20 MIN: CPT | Performed by: NURSE PRACTITIONER

## 2022-08-09 PROCEDURE — 83735 ASSAY OF MAGNESIUM: CPT

## 2022-08-09 PROCEDURE — 84479 ASSAY OF THYROID (T3 OR T4): CPT

## 2022-08-09 PROCEDURE — 84443 ASSAY THYROID STIM HORMONE: CPT

## 2022-08-09 PROCEDURE — 84436 ASSAY OF TOTAL THYROXINE: CPT

## 2022-08-09 RX ORDER — FLUOXETINE HYDROCHLORIDE 20 MG/1
60 CAPSULE ORAL DAILY
Qty: 90 CAPSULE | Refills: 5 | Status: SHIPPED | OUTPATIENT
Start: 2022-08-09

## 2022-08-09 NOTE — PROGRESS NOTES
"Chief Complaint  Hypothyroidism (Follow up - med refills ) and Depression    Subjective  Patient is a 36-year-old female who is here today to follow-up regarding hypothyroidism.  He is currently taking levothyroxine 50 mcg once daily.  Denies side effects and requests refills.  Is still experiencing some fatigue.  Feels as if depression is stable on fluoxetine 60 mg daily.  Does have some concerns with decreased libido.  Was not sure if it was fluoxetine or birth control pills.  Birth control pills.  Is rarely sexually active.  Has [facility] gynec[facility].  Last menstrual cycle is current.  Previous intolerance or worsening of symptoms on Wellbutrin, Zoloft, Lexapro, Abilify.  She feels as if bipolar symptoms were more adverse response to medications and a true bipolar disorder.  She denies mood swings.  Sleep is good        Alexus Fuentes presents to [facility]          Objective   Vital Signs:   Vitals:    08/09/22 1247   BP: 113/69   BP Location: Left arm   Patient Position: Sitting   Cuff Size: Adult   Pulse: 60   SpO2: 100%   Weight: 59.4 kg (131 lb)   Height: 166.4 cm (65.5\")      Body mass index is 21.47 kg/m².  Physical Exam  Vitals reviewed.   Constitutional:       General: She is not in acute distress.     Appearance: Normal appearance. She is well-developed.   Neck:      Thyroid: No thyroid mass, thyromegaly or thyroid tenderness.   Cardiovascular:      Rate and Rhythm: Normal rate and regular rhythm.      Heart sounds: Normal heart sounds.   Pulmonary:      Effort: Pulmonary effort is normal.      Breath sounds: Normal breath sounds.   Musculoskeletal:      Right lower leg: No edema.      Left lower leg: No edema.   Skin:     General: Skin is warm and dry.   Neurological:      General: No focal deficit present.      Mental Status: She is alert.   Psychiatric:         Attention and Perception: Attention normal.         Mood and Affect: Mood and affect " normal.         Behavior: Behavior normal.          Result Review :                   TSH    TSH 9/15/21 12/28/21   TSH 4.950 (A) 4.170   (A) Abnormal value                     Assessment and Plan    Diagnoses and all orders for this visit:    1. Other fatigue (Primary)  Assessment & Plan:  Other treatment pending lab results.    Orders:  -     CBC w AUTO Differential; Future  -     Comprehensive metabolic panel; Future    2. Depression, unspecified depression type  Assessment & Plan:  Decreased libido could be due to to serotonin levels that are increased with use of fluoxetine.  She has had multiple previous medicine intolerances and a distinct worsening of mood and suicidal thoughts with use of Wellbutrin.  Could consider transition to Trintellix or trial of Celexa.  Also could refer her to mental health specialist for further management and evaluation.  She definitely had improvement on fluoxetine but advised her a different medication could also improve symptoms without's potential side effects.  She wants to continue same treatment for now and will consider options listed here.    Orders:  -     FLUoxetine (PROzac) 20 MG capsule; Take 3 capsules by mouth Daily.  Dispense: 90 capsule; Refill: 5    3. Acquired hypothyroidism  Assessment & Plan:  Labs pending.  We will  refill levothyroxine after review of lab results    Orders:  -     Thyroid Panel With TSH; Future      Follow Up    Return in about 6 months (around 2/9/2023).  Patient was given instructions and counseling regarding her condition or for health maintenance advice. Please see specific information pulled into the AVS if appropriate.

## 2022-08-09 NOTE — ASSESSMENT & PLAN NOTE
Decreased libido could be due to to serotonin levels that are increased with use of fluoxetine.  She has had multiple previous medicine intolerances and a distinct worsening of mood and suicidal thoughts with use of Wellbutrin.  Could consider transition to Trintellix or trial of Celexa.  Also could refer her to mental health specialist for further management and evaluation.  She definitely had improvement on fluoxetine but advised her a different medication could also improve symptoms without's potential side effects.  She wants to continue same treatment for now and will consider options listed here.

## 2022-08-10 DIAGNOSIS — E83.51 HYPOCALCEMIA: Primary | ICD-10-CM

## 2022-08-10 DIAGNOSIS — E03.9 ACQUIRED HYPOTHYROIDISM: Primary | ICD-10-CM

## 2022-08-10 LAB
CK SERPL-CCNC: 62 U/L (ref 20–180)
MAGNESIUM SERPL-MCNC: 2 MG/DL (ref 1.6–2.6)
T-UPTAKE NFR SERPL: 0.99 TBI (ref 0.8–1.3)
T4 SERPL-MCNC: 6.53 MCG/DL (ref 4.5–11.7)
TSH SERPL DL<=0.05 MIU/L-ACNC: 6.04 UIU/ML (ref 0.27–4.2)

## 2022-08-10 RX ORDER — LEVOTHYROXINE SODIUM 0.07 MG/1
75 TABLET ORAL DAILY
Qty: 90 TABLET | Refills: 1 | Status: SHIPPED | OUTPATIENT
Start: 2022-08-10

## 2022-08-10 NOTE — PROGRESS NOTES
Levothyroxine 50 mcg daily is not strong enough.  I recommend increasing levothyroxine to 75 mcg once daily.  I have sent a new prescription to your pharmacy.  I would recommend repeating a thyroid blood test around 1 November.  You are not currently anemic but your calcium level has consistently been low.  I am going to add a couple lab test to rule out a parathyroid issue or magnesium deficiency.  Further treatment pending those results.  Kidney and liver function look pretty good

## 2022-08-12 LAB — PTH-INTACT SERPL-MCNC: 39 PG/ML (ref 15–65)

## 2022-08-12 NOTE — PROGRESS NOTES
Parathyroid hormone level, magnesium, and CK level are all normal.  I recommend to take a women's daily multivitamin that will happen to contain calcium.  We will continue to monitor your labs routinely.

## 2022-11-09 ENCOUNTER — TELEPHONE (OUTPATIENT)
Dept: FAMILY MEDICINE CLINIC | Age: 37
End: 2022-11-09

## 2022-11-09 NOTE — TELEPHONE ENCOUNTER
----- Message from Oralia Cole LPN sent at 11/8/2022  8:15 AM EST -----      ----- Message -----  From: SYSTEM  Sent: 11/6/2022   1:21 AM EST  To: [facility]

## 2022-11-15 ENCOUNTER — LAB (OUTPATIENT)
Dept: LAB | Facility: HOSPITAL | Age: 37
End: 2022-11-15

## 2022-11-15 DIAGNOSIS — E03.9 ACQUIRED HYPOTHYROIDISM: ICD-10-CM

## 2022-11-15 LAB — TSH SERPL DL<=0.05 MIU/L-ACNC: 2.17 UIU/ML (ref 0.27–4.2)

## 2022-11-15 PROCEDURE — 36415 COLL VENOUS BLD VENIPUNCTURE: CPT

## 2022-11-15 PROCEDURE — 84443 ASSAY THYROID STIM HORMONE: CPT

## 2023-05-12 DIAGNOSIS — F32.A DEPRESSION, UNSPECIFIED DEPRESSION TYPE: ICD-10-CM

## 2023-05-12 RX ORDER — FLUOXETINE HYDROCHLORIDE 20 MG/1
CAPSULE ORAL
Qty: 90 CAPSULE | Refills: 0 | Status: SHIPPED | OUTPATIENT
Start: 2023-05-12

## 2023-06-01 ENCOUNTER — LAB (OUTPATIENT)
Dept: LAB | Facility: HOSPITAL | Age: 38
End: 2023-06-01
Payer: COMMERCIAL

## 2023-06-01 ENCOUNTER — OFFICE VISIT (OUTPATIENT)
Dept: FAMILY MEDICINE CLINIC | Age: 38
End: 2023-06-01

## 2023-06-01 VITALS
OXYGEN SATURATION: 98 % | BODY MASS INDEX: 21.53 KG/M2 | WEIGHT: 134 LBS | TEMPERATURE: 98.5 F | HEIGHT: 66 IN | DIASTOLIC BLOOD PRESSURE: 73 MMHG | SYSTOLIC BLOOD PRESSURE: 109 MMHG | HEART RATE: 70 BPM

## 2023-06-01 DIAGNOSIS — R53.83 OTHER FATIGUE: ICD-10-CM

## 2023-06-01 DIAGNOSIS — F32.A DEPRESSION, UNSPECIFIED DEPRESSION TYPE: ICD-10-CM

## 2023-06-01 DIAGNOSIS — E03.9 ACQUIRED HYPOTHYROIDISM: Primary | ICD-10-CM

## 2023-06-01 LAB
ALBUMIN SERPL-MCNC: 3.9 G/DL (ref 3.5–5.2)
ALBUMIN/GLOB SERPL: 1.6 G/DL
ALP SERPL-CCNC: 51 U/L (ref 39–117)
ALT SERPL W P-5'-P-CCNC: 11 U/L (ref 1–33)
ANION GAP SERPL CALCULATED.3IONS-SCNC: 9.2 MMOL/L (ref 5–15)
AST SERPL-CCNC: 18 U/L (ref 1–32)
BILIRUB SERPL-MCNC: 0.4 MG/DL (ref 0–1.2)
BUN SERPL-MCNC: 14 MG/DL (ref 6–20)
BUN/CREAT SERPL: 15.4 (ref 7–25)
CALCIUM SPEC-SCNC: 8.8 MG/DL (ref 8.6–10.5)
CHLORIDE SERPL-SCNC: 107 MMOL/L (ref 98–107)
CO2 SERPL-SCNC: 24.8 MMOL/L (ref 22–29)
CREAT SERPL-MCNC: 0.91 MG/DL (ref 0.57–1)
EGFRCR SERPLBLD CKD-EPI 2021: 84 ML/MIN/1.73
GLOBULIN UR ELPH-MCNC: 2.5 GM/DL
GLUCOSE SERPL-MCNC: 88 MG/DL (ref 65–99)
POTASSIUM SERPL-SCNC: 4 MMOL/L (ref 3.5–5.2)
PROT SERPL-MCNC: 6.4 G/DL (ref 6–8.5)
SODIUM SERPL-SCNC: 141 MMOL/L (ref 136–145)
TSH SERPL DL<=0.05 MIU/L-ACNC: 2.38 UIU/ML (ref 0.27–4.2)

## 2023-06-01 PROCEDURE — 99213 OFFICE O/P EST LOW 20 MIN: CPT | Performed by: NURSE PRACTITIONER

## 2023-06-01 PROCEDURE — 36415 COLL VENOUS BLD VENIPUNCTURE: CPT

## 2023-06-01 PROCEDURE — 84443 ASSAY THYROID STIM HORMONE: CPT

## 2023-06-01 PROCEDURE — 82607 VITAMIN B-12: CPT

## 2023-06-01 PROCEDURE — 80053 COMPREHEN METABOLIC PANEL: CPT

## 2023-06-01 RX ORDER — FLUOXETINE HYDROCHLORIDE 20 MG/1
60 CAPSULE ORAL DAILY
Qty: 270 CAPSULE | Refills: 1 | Status: SHIPPED | OUTPATIENT
Start: 2023-06-01

## 2023-06-01 RX ORDER — LEVOTHYROXINE SODIUM 0.07 MG/1
75 TABLET ORAL DAILY
Qty: 90 TABLET | Refills: 1 | Status: SHIPPED | OUTPATIENT
Start: 2023-06-01

## 2023-06-01 NOTE — ASSESSMENT & PLAN NOTE
We will recheck hypocalcemia.  Parathyroid hormone was normal with last labs.  Further treatment pending lab results.  Consider sleep study.

## 2023-06-01 NOTE — PROGRESS NOTES
"Chief Complaint  Hypothyroidism (Fatigue, medication refills )    Subjective          Alexus Fuentes presents to [facility]     Patient is a 37-year-old female who is here today to follow-up regarding depression.  Symptoms are currently stable on fluoxetine 20 mg capsules once daily.  She denies side effects and requests refills.  Has done better on fluoxetine than any other previous treatment for depression.  She had a past diagnosis of bipolar disorder but she feels as if her symptoms are more an adverse reaction to the medications than true bipolar disorder.  Previous ineffective treatments include Wellbutrin, venlafaxine, Zoloft, Lexapro, and Abilify.  She is not suicidal and denies any significant stress.  Does still experience some fatigue and has no problems with sleep.  States she has some difficulty with waking up.  Works night shift every other week for [employer].  She works 7 nights consecutively then with 7 consecutive nights off.  She has worked the schedule for 3 years.  Has started a daily multivitamin.  She is not told that she snores but boyfriend tells her that she moves her limbs in her sleep.  Patient has never had a sleep study.  Denies restless leg symptoms.     lmp may 6 and due a Pap smear.  Patient ask which gynecologist are available to be seen in [place].  A list of gynecologist that practice in [place] is provided to patient.    Objective   Vital Signs:   Vitals:    06/01/23 1254   BP: 109/73   BP Location: Left arm   Patient Position: Sitting   Cuff Size: Adult   Pulse: 70   Temp: 98.5 °F (36.9 °C)   TempSrc: Oral   SpO2: 98%   Weight: 60.8 kg (134 lb)   Height: 166.4 cm (65.51\")      Body mass index is 21.95 kg/m².  Physical Exam  Vitals reviewed.   Constitutional:       General: She is not in acute distress.     Appearance: Normal appearance. She is well-developed.   Neck:      Thyroid: No thyromegaly.   Cardiovascular:      Rate and Rhythm: " Normal rate and regular rhythm.      Heart sounds: Normal heart sounds.   Pulmonary:      Effort: Pulmonary effort is normal.      Breath sounds: Normal breath sounds.   Skin:     General: Skin is warm and dry.   Neurological:      General: No focal deficit present.      Mental Status: She is alert.   Psychiatric:         Attention and Perception: Attention normal.         Mood and Affect: Mood and affect normal.         Behavior: Behavior normal.           Current Outpatient Medications:   •  FLUoxetine (PROzac) 20 MG capsule, Take 3 capsules by mouth Daily., Disp: 270 capsule, Rfl: 1  •  levothyroxine (Synthroid) 75 MCG tablet, Take 1 tablet by mouth Daily., Disp: 90 tablet, Rfl: 1   Past Medical History:   Diagnosis Date   • Acute pyelonephritis 7/27/2017   • Depression    • UTI (urinary tract infection)          Result Review :     CMP        8/9/2022    13:30   CMP   Glucose 85     BUN 15     Creatinine 1.01     EGFR 74.6     Sodium 140     Potassium 3.9     Chloride 103     Calcium 8.3     Total Protein 6.3     Albumin 3.90     Globulin 2.4     Total Bilirubin <0.2     Alkaline Phosphatase 58     AST (SGOT) 19     ALT (SGPT) 13     Albumin/Globulin Ratio 1.6     BUN/Creatinine Ratio 14.9     Anion Gap 10.6       CBC        8/9/2022    13:30   CBC   WBC 8.07     RBC 3.92     Hemoglobin 12.1     Hematocrit 38.0     MCV 96.9     MCH 30.9     MCHC 31.8     RDW 12.0     Platelets 262       CBC w/diff        8/9/2022    13:30   CBC w/Diff   WBC 8.07     RBC 3.92     Hemoglobin 12.1     Hematocrit 38.0     MCV 96.9     MCH 30.9     MCHC 31.8     RDW 12.0     Platelets 262     Neutrophil Rel % 45.4     Immature Granulocyte Rel % 0.1     Lymphocyte Rel % 44.4     Monocyte Rel % 6.1     Eosinophil Rel % 3.0     Basophil Rel % 1.0           TSH        8/9/2022    13:30 11/15/2022    09:50   TSH   TSH 6.040   2.170                    Assessment and Plan    Diagnoses and all orders for this visit:    1. Acquired  hypothyroidism (Primary)  -     levothyroxine (Synthroid) 75 MCG tablet; Take 1 tablet by mouth Daily.  Dispense: 90 tablet; Refill: 1    2. Depression, unspecified depression type  Assessment & Plan:  Symptoms currently stable.  Wants to continue current treatment.    Orders:  -     FLUoxetine (PROzac) 20 MG capsule; Take 3 capsules by mouth Daily.  Dispense: 270 capsule; Refill: 1    3. Other fatigue  Assessment & Plan:  We will recheck hypocalcemia.  Parathyroid hormone was normal with last labs.  Further treatment pending lab results.  Consider sleep study.    Orders:  -     Comprehensive metabolic panel; Future  -     TSH Rfx On Abnormal To Free T4; Future  -     Vitamin B12; Future      Follow Up    No follow-ups on file.  Patient was given instructions and counseling regarding her condition or for health maintenance advice. Please see specific information pulled into the AVS if appropriate.

## 2023-06-02 LAB — VIT B12 BLD-MCNC: 817 PG/ML (ref 211–946)

## 2023-06-02 NOTE — PROGRESS NOTES
Vitamin B12 level, blood sugar, kidney and liver function are normal.  Thyroid  function is also normal.

## 2023-10-20 DIAGNOSIS — F32.A DEPRESSION, UNSPECIFIED DEPRESSION TYPE: ICD-10-CM

## 2023-10-20 RX ORDER — FLUOXETINE HYDROCHLORIDE 20 MG/1
60 CAPSULE ORAL DAILY
Qty: 270 CAPSULE | Refills: 0 | Status: SHIPPED | OUTPATIENT
Start: 2023-10-20

## 2023-11-19 DIAGNOSIS — E03.9 ACQUIRED HYPOTHYROIDISM: ICD-10-CM

## 2023-12-01 RX ORDER — LEVOTHYROXINE SODIUM 0.07 MG/1
75 TABLET ORAL DAILY
Qty: 90 TABLET | Refills: 0 | Status: SHIPPED | OUTPATIENT
Start: 2023-12-01

## 2024-03-28 DIAGNOSIS — E03.9 ACQUIRED HYPOTHYROIDISM: ICD-10-CM

## 2024-03-28 RX ORDER — LEVOTHYROXINE SODIUM 0.07 MG/1
75 TABLET ORAL DAILY
Qty: 90 TABLET | Refills: 0 | Status: SHIPPED | OUTPATIENT
Start: 2024-03-28

## 2024-04-09 ENCOUNTER — LAB (OUTPATIENT)
Dept: LAB | Facility: HOSPITAL | Age: 39
End: 2024-04-09
Payer: COMMERCIAL

## 2024-04-09 ENCOUNTER — OFFICE VISIT (OUTPATIENT)
Dept: FAMILY MEDICINE CLINIC | Age: 39
End: 2024-04-09
Payer: COMMERCIAL

## 2024-04-09 VITALS
HEART RATE: 76 BPM | SYSTOLIC BLOOD PRESSURE: 107 MMHG | WEIGHT: 142 LBS | DIASTOLIC BLOOD PRESSURE: 73 MMHG | BODY MASS INDEX: 22.82 KG/M2 | HEIGHT: 66 IN | OXYGEN SATURATION: 99 %

## 2024-04-09 DIAGNOSIS — Z13.1 DIABETES MELLITUS SCREENING: ICD-10-CM

## 2024-04-09 DIAGNOSIS — Z13.0 SCREENING FOR DEFICIENCY ANEMIA: ICD-10-CM

## 2024-04-09 DIAGNOSIS — E03.9 ACQUIRED HYPOTHYROIDISM: Primary | ICD-10-CM

## 2024-04-09 DIAGNOSIS — F41.8 ANXIETY WITH DEPRESSION: ICD-10-CM

## 2024-04-09 DIAGNOSIS — K64.9 HEMORRHOIDS, UNSPECIFIED HEMORRHOID TYPE: ICD-10-CM

## 2024-04-09 DIAGNOSIS — E03.9 ACQUIRED HYPOTHYROIDISM: ICD-10-CM

## 2024-04-09 LAB
ALBUMIN SERPL-MCNC: 4 G/DL (ref 3.5–5.2)
ALBUMIN/GLOB SERPL: 1.5 G/DL
ALP SERPL-CCNC: 58 U/L (ref 39–117)
ALT SERPL W P-5'-P-CCNC: 16 U/L (ref 1–33)
ANION GAP SERPL CALCULATED.3IONS-SCNC: 7 MMOL/L (ref 5–15)
AST SERPL-CCNC: 15 U/L (ref 1–32)
BASOPHILS # BLD AUTO: 0.09 10*3/MM3 (ref 0–0.2)
BASOPHILS NFR BLD AUTO: 1.1 % (ref 0–1.5)
BILIRUB SERPL-MCNC: 0.2 MG/DL (ref 0–1.2)
BUN SERPL-MCNC: 12 MG/DL (ref 6–20)
BUN/CREAT SERPL: 11.9 (ref 7–25)
CALCIUM SPEC-SCNC: 8.8 MG/DL (ref 8.6–10.5)
CHLORIDE SERPL-SCNC: 106 MMOL/L (ref 98–107)
CO2 SERPL-SCNC: 27 MMOL/L (ref 22–29)
CREAT SERPL-MCNC: 1.01 MG/DL (ref 0.57–1)
DEPRECATED RDW RBC AUTO: 44.8 FL (ref 37–54)
EGFRCR SERPLBLD CKD-EPI 2021: 73.7 ML/MIN/1.73
EOSINOPHIL # BLD AUTO: 0.3 10*3/MM3 (ref 0–0.4)
EOSINOPHIL NFR BLD AUTO: 3.6 % (ref 0.3–6.2)
ERYTHROCYTE [DISTWIDTH] IN BLOOD BY AUTOMATED COUNT: 12.2 % (ref 12.3–15.4)
GLOBULIN UR ELPH-MCNC: 2.6 GM/DL
GLUCOSE SERPL-MCNC: 68 MG/DL (ref 65–99)
HCT VFR BLD AUTO: 41.5 % (ref 34–46.6)
HGB BLD-MCNC: 13.3 G/DL (ref 12–15.9)
IMM GRANULOCYTES # BLD AUTO: 0.03 10*3/MM3 (ref 0–0.05)
IMM GRANULOCYTES NFR BLD AUTO: 0.4 % (ref 0–0.5)
LYMPHOCYTES # BLD AUTO: 2.81 10*3/MM3 (ref 0.7–3.1)
LYMPHOCYTES NFR BLD AUTO: 33.9 % (ref 19.6–45.3)
MCH RBC QN AUTO: 31.9 PG (ref 26.6–33)
MCHC RBC AUTO-ENTMCNC: 32 G/DL (ref 31.5–35.7)
MCV RBC AUTO: 99.5 FL (ref 79–97)
MONOCYTES # BLD AUTO: 0.54 10*3/MM3 (ref 0.1–0.9)
MONOCYTES NFR BLD AUTO: 6.5 % (ref 5–12)
NEUTROPHILS NFR BLD AUTO: 4.51 10*3/MM3 (ref 1.7–7)
NEUTROPHILS NFR BLD AUTO: 54.5 % (ref 42.7–76)
PLATELET # BLD AUTO: 314 10*3/MM3 (ref 140–450)
PMV BLD AUTO: 10.2 FL (ref 6–12)
POTASSIUM SERPL-SCNC: 4.1 MMOL/L (ref 3.5–5.2)
PROT SERPL-MCNC: 6.6 G/DL (ref 6–8.5)
RBC # BLD AUTO: 4.17 10*6/MM3 (ref 3.77–5.28)
SODIUM SERPL-SCNC: 140 MMOL/L (ref 136–145)
TSH SERPL DL<=0.05 MIU/L-ACNC: 3.23 UIU/ML (ref 0.27–4.2)
WBC NRBC COR # BLD AUTO: 8.28 10*3/MM3 (ref 3.4–10.8)

## 2024-04-09 PROCEDURE — 99214 OFFICE O/P EST MOD 30 MIN: CPT | Performed by: NURSE PRACTITIONER

## 2024-04-09 PROCEDURE — 85025 COMPLETE CBC W/AUTO DIFF WBC: CPT

## 2024-04-09 PROCEDURE — 84443 ASSAY THYROID STIM HORMONE: CPT

## 2024-04-09 PROCEDURE — 80053 COMPREHEN METABOLIC PANEL: CPT

## 2024-04-09 PROCEDURE — 36415 COLL VENOUS BLD VENIPUNCTURE: CPT

## 2024-04-09 RX ORDER — FLUOXETINE HYDROCHLORIDE 20 MG/1
60 CAPSULE ORAL DAILY
Qty: 270 CAPSULE | Refills: 1 | Status: SHIPPED | OUTPATIENT
Start: 2024-04-09

## 2024-04-09 RX ORDER — HYDROCORTISONE 25 MG/G
CREAM TOPICAL 2 TIMES DAILY
Qty: 28 G | Refills: 1 | Status: SHIPPED | OUTPATIENT
Start: 2024-04-09

## 2024-04-09 NOTE — PROGRESS NOTES
"Chief Complaint  Hypothyroidism (Follow up - med refills ) and Depression    Subjective          Alexus Fuentes presents to [facility]     Patient is a 38-year-old female who is here today to follow-up regarding hypothyroidism.  She is currently taking levothyroxine 75 mcg once daily.  Denies side effects and requests refills after labs are reviewed.  He does continue to experience fatigue.  Is not told that she snores but is told that she moves a lot in her sleep.  Has never had a sleep study.    Patient reports intermittent issues with hemorrhoids and a scant amount of bright red blood on the toilet paper intermittently.  She recently had her Pap smear done and Dr. Osorio, [facility]necologist with [facility], pointed out she had a hemorrhoid.  Patient has never been constipated and denies any change in stool patterns or diarrhea or straining to have a bowel movement.  She does not perform heavy lifting.  She does sit for long periods of time for work.  Tucks pads have been minimally effective.  1 previous vaginal delivery.  Denies abdominal pain.    Last menstrual cycle April 4.    Anxiety and depression are stable on fluoxetine 20 mg 3 capsules once daily.  Previously been diagnosed with bipolar but patient currently denies mood swings.  Anxiety mainly due to driving or specific situations.  Patient denies problems with sleep.  Has previously taken Wellbutrin, venlafaxine, Zoloft, Lexapro, and Abilify.  Fluoxetine has helped much better than any of those previous medications.     Objective   Vital Signs:   Vitals:    04/09/24 1031   BP: 107/73   BP Location: Right arm   Patient Position: Sitting   Cuff Size: Adult   Pulse: 76   SpO2: 99%   Weight: 64.4 kg (142 lb)   Height: 166.4 cm (65.51\")       Wt Readings from Last 3 Encounters:   04/09/24 64.4 kg (142 lb)   06/01/23 60.8 kg (134 lb)   08/09/22 59.4 kg (131 lb)      BP Readings from Last 3 Encounters:   04/09/24 107/73   06/01/23 " 109/73   08/09/22 113/69       Body mass index is 23.26 kg/m².    BMI is within normal parameters. No other follow-up for BMI required.       Physical Exam  Vitals reviewed.   Constitutional:       General: She is not in acute distress.     Appearance: Normal appearance. She is well-developed.   Neck:      Thyroid: No thyromegaly.   Cardiovascular:      Rate and Rhythm: Normal rate and regular rhythm.      Heart sounds: Normal heart sounds.   Pulmonary:      Effort: Pulmonary effort is normal.      Breath sounds: Normal breath sounds.   Musculoskeletal:      Right lower leg: No edema.      Left lower leg: No edema.   Skin:     General: Skin is warm and dry.   Neurological:      General: No focal deficit present.      Mental Status: She is alert.   Psychiatric:         Attention and Perception: Attention normal.         Mood and Affect: Mood and affect normal.         Behavior: Behavior normal.           Current Outpatient Medications:     FLUoxetine (PROzac) 20 MG capsule, Take 3 capsules by mouth Daily., Disp: 270 capsule, Rfl: 1    levothyroxine (SYNTHROID, LEVOTHROID) 75 MCG tablet, Take 1 tablet by mouth once daily, Disp: 90 tablet, Rfl: 0    Hydrocortisone, Perianal, (ANUSOL-HC) 2.5 % rectal cream, Insert  into the rectum 2 (Two) Times a Day., Disp: 28 g, Rfl: 1   Past Medical History:   Diagnosis Date    Acute pyelonephritis 07/27/2017    Depression     Hypothyroidism     UTI (urinary tract infection)      Allergies   Allergen Reactions    Wellbutrin [Bupropion] Other (See Comments)     Suicidal ideation               Result Review :     Common labs          6/1/2023    13:26 4/9/2024    11:14   Common Labs   Glucose 88  68    BUN 14  12    Creatinine 0.91  1.01    Sodium 141  140    Potassium 4.0  4.1    Chloride 107  106    Calcium 8.8  8.8    Albumin 3.9  4.0    Total Bilirubin 0.4  0.2    Alkaline Phosphatase 51  58    AST (SGOT) 18  15    ALT (SGPT) 11  16    WBC  8.28    Hemoglobin  13.3    Hematocrit   41.5    Platelets  314         No Images in the past 120 days found..           Social History     Tobacco Use   Smoking Status Never   Smokeless Tobacco Never           Assessment and Plan    Diagnoses and all orders for this visit:    1. Acquired hypothyroidism (Primary)  Assessment & Plan:  Further treatment recommendations pending lab results.    Orders:  -     TSH Rfx On Abnormal To Free T4; Future    2. Anxiety with depression  -     FLUoxetine (PROzac) 20 MG capsule; Take 3 capsules by mouth Daily.  Dispense: 270 capsule; Refill: 1    3. Hemorrhoids, unspecified hemorrhoid type  Assessment & Plan:  Defers rectal exam.  To implement further conservative treatments and included daily sitz bath.  Consider referral to a general surgeon for further evaluation of hemorrhoids and possible banding if persists.  Patient states she will call back if she wishes to pursue any further evaluation.  A lower scope was always recommended if having any rectal bleeding or change in stool patterns.    Orders:  -     Hydrocortisone, Perianal, (ANUSOL-HC) 2.5 % rectal cream; Insert  into the rectum 2 (Two) Times a Day.  Dispense: 28 g; Refill: 1    4. Screening for deficiency anemia  -     CBC w AUTO Differential; Future    5. Diabetes mellitus screening  -     Comprehensive metabolic panel; Future        Follow Up    No follow-ups on file.  Patient was given instructions and counseling regarding her condition or for health maintenance advice. Please see specific information pulled into the AVS if appropriate.

## 2024-04-10 NOTE — PROGRESS NOTES
Creatinine is upper limits of normal.  Creatinine, BUN, and EGFR tell us about kidney function.  Blood sugar, liver function and thyroid function are normal.  You are not anemic.

## 2024-04-10 NOTE — ASSESSMENT & PLAN NOTE
Defers rectal exam.  To implement further conservative treatments and included daily sitz bath.  Consider referral to a general surgeon for further evaluation of hemorrhoids and possible banding if persists.  Patient states she will call back if she wishes to pursue any further evaluation.  A lower scope was always recommended if having any rectal bleeding or change in stool patterns.

## 2024-08-23 DIAGNOSIS — E03.9 ACQUIRED HYPOTHYROIDISM: ICD-10-CM

## 2024-08-23 RX ORDER — LEVOTHYROXINE SODIUM 0.07 MG/1
75 TABLET ORAL DAILY
Qty: 90 TABLET | Refills: 0 | Status: SHIPPED | OUTPATIENT
Start: 2024-08-23

## 2024-11-07 DIAGNOSIS — F41.8 ANXIETY WITH DEPRESSION: ICD-10-CM

## 2025-01-07 DIAGNOSIS — E03.9 ACQUIRED HYPOTHYROIDISM: ICD-10-CM

## 2025-01-07 DIAGNOSIS — F41.8 ANXIETY WITH DEPRESSION: ICD-10-CM

## 2025-01-08 ENCOUNTER — TELEPHONE (OUTPATIENT)
Dept: FAMILY MEDICINE CLINIC | Age: 40
End: 2025-01-08
Payer: COMMERCIAL

## 2025-01-08 RX ORDER — LEVOTHYROXINE SODIUM 75 UG/1
75 TABLET ORAL DAILY
Qty: 90 TABLET | Refills: 0 | Status: SHIPPED | OUTPATIENT
Start: 2025-01-08

## 2025-01-08 NOTE — TELEPHONE ENCOUNTER
HUB TO RELAY     Lmtrc in regards to requested medication refills prozac and levothyroxine. Pt is due for a follow up and labs. Carina is out of office this week, appt needs to be rescheduled that is on the schedule for 1/9/24

## 2025-01-23 ENCOUNTER — LAB (OUTPATIENT)
Dept: LAB | Facility: HOSPITAL | Age: 40
End: 2025-01-23
Payer: COMMERCIAL

## 2025-01-23 ENCOUNTER — OFFICE VISIT (OUTPATIENT)
Dept: FAMILY MEDICINE CLINIC | Age: 40
End: 2025-01-23
Payer: COMMERCIAL

## 2025-01-23 VITALS
BODY MASS INDEX: 23.3 KG/M2 | HEART RATE: 65 BPM | TEMPERATURE: 97.8 F | WEIGHT: 145 LBS | SYSTOLIC BLOOD PRESSURE: 114 MMHG | HEIGHT: 66 IN | DIASTOLIC BLOOD PRESSURE: 73 MMHG | OXYGEN SATURATION: 98 %

## 2025-01-23 DIAGNOSIS — E03.9 ACQUIRED HYPOTHYROIDISM: Primary | ICD-10-CM

## 2025-01-23 DIAGNOSIS — Z13.1 DIABETES MELLITUS SCREENING: ICD-10-CM

## 2025-01-23 DIAGNOSIS — E03.9 ACQUIRED HYPOTHYROIDISM: ICD-10-CM

## 2025-01-23 DIAGNOSIS — Z13.220 SCREENING CHOLESTEROL LEVEL: ICD-10-CM

## 2025-01-23 DIAGNOSIS — F41.8 ANXIETY WITH DEPRESSION: ICD-10-CM

## 2025-01-23 LAB
ALBUMIN SERPL-MCNC: 3.5 G/DL (ref 3.5–5.2)
ALBUMIN/GLOB SERPL: 1.3 G/DL
ALP SERPL-CCNC: 43 U/L (ref 39–117)
ALT SERPL W P-5'-P-CCNC: 11 U/L (ref 1–33)
ANION GAP SERPL CALCULATED.3IONS-SCNC: 8.4 MMOL/L (ref 5–15)
AST SERPL-CCNC: 15 U/L (ref 1–32)
BILIRUB SERPL-MCNC: 0.3 MG/DL (ref 0–1.2)
BUN SERPL-MCNC: 15 MG/DL (ref 6–20)
BUN/CREAT SERPL: 15.8 (ref 7–25)
CALCIUM SPEC-SCNC: 8.8 MG/DL (ref 8.6–10.5)
CHLORIDE SERPL-SCNC: 101 MMOL/L (ref 98–107)
CHOLEST SERPL-MCNC: 151 MG/DL (ref 0–200)
CO2 SERPL-SCNC: 26.6 MMOL/L (ref 22–29)
CREAT SERPL-MCNC: 0.95 MG/DL (ref 0.57–1)
EGFRCR SERPLBLD CKD-EPI 2021: 78.8 ML/MIN/1.73
GLOBULIN UR ELPH-MCNC: 2.6 GM/DL
GLUCOSE SERPL-MCNC: 85 MG/DL (ref 65–99)
HDLC SERPL-MCNC: 56 MG/DL (ref 40–60)
LDLC SERPL CALC-MCNC: 81 MG/DL (ref 0–100)
LDLC/HDLC SERPL: 1.44 {RATIO}
POTASSIUM SERPL-SCNC: 4.3 MMOL/L (ref 3.5–5.2)
PROT SERPL-MCNC: 6.1 G/DL (ref 6–8.5)
SODIUM SERPL-SCNC: 136 MMOL/L (ref 136–145)
TRIGL SERPL-MCNC: 72 MG/DL (ref 0–150)
TSH SERPL DL<=0.05 MIU/L-ACNC: 3.04 UIU/ML (ref 0.27–4.2)
VLDLC SERPL-MCNC: 14 MG/DL (ref 5–40)

## 2025-01-23 PROCEDURE — 80061 LIPID PANEL: CPT

## 2025-01-23 PROCEDURE — 36415 COLL VENOUS BLD VENIPUNCTURE: CPT

## 2025-01-23 PROCEDURE — 84443 ASSAY THYROID STIM HORMONE: CPT

## 2025-01-23 PROCEDURE — 99214 OFFICE O/P EST MOD 30 MIN: CPT | Performed by: NURSE PRACTITIONER

## 2025-01-23 PROCEDURE — 80053 COMPREHEN METABOLIC PANEL: CPT

## 2025-01-23 RX ORDER — LEVOTHYROXINE SODIUM 75 UG/1
75 TABLET ORAL DAILY
Qty: 90 TABLET | Refills: 1 | Status: SHIPPED | OUTPATIENT
Start: 2025-01-23

## 2025-01-23 NOTE — PROGRESS NOTES
"Chief Complaint  Hypothyroidism (6 month follow up ), Anxiety, and Depression    Subjective          Alexus Fuentes presents to [facility]     Patient is a 39-year-old female who is here today for follow-up regarding hypothyroidism.  Current treatment is levothyroxine 75 mcg daily.  Denies side effects and requests refills.    Last menstrual cycle January 13.  Last Pap smear 1 year ago.    Depression is stable currently on fluoxetine 20 mg 3 capsules once daily.  Denies side effects and requests refills.  No current concerns for mood swings or bipolar symptoms.  Has done better with fluoxetine than her previous Wellbutrin, venlafaxine, Zoloft, Lexapro, or Abilify.  Patient denies concerns today.     Last tetanus vaccine was received around the time of Daughtery birth 10 years ago.  Recommend tetanus diphtheria booster dose in the near future.  May receive tetanus diphtheria here at her local pharmacy.    Objective   Vital Signs:   Vitals:    01/23/25 1113   BP: 114/73   BP Location: Right arm   Patient Position: Sitting   Cuff Size: Adult   Pulse: 65   Temp: 97.8 °F (36.6 °C)   TempSrc: Oral   SpO2: 98%   Weight: 65.8 kg (145 lb)   Height: 166.4 cm (65.51\")       Wt Readings from Last 3 Encounters:   01/23/25 65.8 kg (145 lb)   04/09/24 64.4 kg (142 lb)   06/01/23 60.8 kg (134 lb)      BP Readings from Last 3 Encounters:   01/23/25 114/73   04/09/24 107/73   06/01/23 109/73       Body mass index is 23.76 kg/m².    BMI is within normal parameters. No other follow-up for BMI required.       Physical Exam  Vitals reviewed.   Constitutional:       General: She is not in acute distress.     Appearance: Normal appearance. She is well-developed.   Neck:      Thyroid: No thyromegaly.   Cardiovascular:      Rate and Rhythm: Normal rate and regular rhythm.      Heart sounds: Normal heart sounds.   Pulmonary:      Effort: Pulmonary effort is normal.      Breath sounds: Normal breath sounds. "   Musculoskeletal:      Right lower leg: No edema.      Left lower leg: No edema.   Skin:     General: Skin is warm and dry.   Neurological:      General: No focal deficit present.      Mental Status: She is alert.   Psychiatric:         Attention and Perception: Attention normal.         Mood and Affect: Mood and affect normal.         Behavior: Behavior normal.           Current Outpatient Medications:     FLUoxetine (PROzac) 20 MG capsule, Take 3 capsules by mouth Daily., Disp: 270 capsule, Rfl: 1    levothyroxine (SYNTHROID, LEVOTHROID) 75 MCG tablet, Take 1 tablet by mouth Daily., Disp: 90 tablet, Rfl: 1   Past Medical History:   Diagnosis Date    Acute pyelonephritis 07/27/2017    Depression     Hypothyroidism     UTI (urinary tract infection)      Allergies   Allergen Reactions    Wellbutrin [Bupropion] Other (See Comments)     Suicidal ideation               Result Review :     Common labs          4/9/2024    11:14   Common Labs   Glucose 68    BUN 12    Creatinine 1.01    Sodium 140    Potassium 4.1    Chloride 106    Calcium 8.8    Albumin 4.0    Total Bilirubin 0.2    Alkaline Phosphatase 58    AST (SGOT) 15    ALT (SGPT) 16    WBC 8.28    Hemoglobin 13.3    Hematocrit 41.5    Platelets 314         No Images in the past 120 days found..           Social History     Tobacco Use   Smoking Status Never   Smokeless Tobacco Never           Assessment and Plan    Diagnoses and all orders for this visit:    1. Acquired hypothyroidism (Primary)  Assessment & Plan:  Further treatment recommendations pending lab results.  Had a normal lipid panel in 2017.  Patient agreeable to get a screening fasting lipid panel for routine monitoring.  Follow-up in 6 months or sooner if concerns.    Orders:  -     TSH Rfx On Abnormal To Free T4; Future  -     levothyroxine (SYNTHROID, LEVOTHROID) 75 MCG tablet; Take 1 tablet by mouth Daily.  Dispense: 90 tablet; Refill: 1    2. Screening cholesterol level  -     Lipid panel;  Future    3. Diabetes mellitus screening  -     Comprehensive metabolic panel; Future    4. Anxiety with depression  -     FLUoxetine (PROzac) 20 MG capsule; Take 3 capsules by mouth Daily.  Dispense: 270 capsule; Refill: 1        Follow Up    Return in about 6 months (around 7/23/2025).  Patient was given instructions and counseling regarding her condition or for health maintenance advice. Please see specific information pulled into the AVS if appropriate.

## 2025-01-23 NOTE — ASSESSMENT & PLAN NOTE
Further treatment recommendations pending lab results.  Had a normal lipid panel in 2017.  Patient agreeable to get a screening fasting lipid panel for routine monitoring.  Follow-up in 6 months or sooner if concerns.

## 2025-04-09 ENCOUNTER — TELEPHONE (OUTPATIENT)
Dept: FAMILY MEDICINE CLINIC | Age: 40
End: 2025-04-09
Payer: COMMERCIAL

## 2025-04-09 NOTE — TELEPHONE ENCOUNTER
Spoke to Allan this am and advised if they couldn't get her in to [facility] in short term, he would need to try to get her to go to ER also

## 2025-04-09 NOTE — TELEPHONE ENCOUNTER
Caller: Allan Lawton    Relationship to patient: Emergency Contact    Best call back number: [phone]    Patient is needing: PATIENT'S SIGNIFICANT OTHER SAID PATIENT IS NEEDING A REFERRAL TO BE SEEN BY [facility](PER THAT OFFICE) AND PATIENT IS HAVING A BIPOLAR EPISODE. PATIENT'S SIGNIFICANT OTHER WOULD LIKE A CALL BACK AS SOON AS POSSIBLE PLEASE.

## 2025-04-10 DIAGNOSIS — F31.64 BIPOLAR DISORDER, CURR EPISODE MIXED, SEVERE, WITH PSYCHOTIC FEATURES: Primary | ICD-10-CM

## 2025-04-10 NOTE — TELEPHONE ENCOUNTER
Referral ordered.  Please call and check status.  Can provide number to [facility] [phone] also

## 2025-04-10 NOTE — TELEPHONE ENCOUNTER
Spoke to S/O and he has spoken to [facility] and they have given him some resources.  He is concerned about the Fluoxetine that it is treating her depression, but may be making her fercho worse.

## 2025-05-01 ENCOUNTER — OFFICE VISIT (OUTPATIENT)
Dept: FAMILY MEDICINE CLINIC | Age: 40
End: 2025-05-01
Payer: COMMERCIAL

## 2025-05-01 VITALS
OXYGEN SATURATION: 100 % | SYSTOLIC BLOOD PRESSURE: 118 MMHG | BODY MASS INDEX: 21.21 KG/M2 | HEIGHT: 66 IN | WEIGHT: 132 LBS | DIASTOLIC BLOOD PRESSURE: 72 MMHG | TEMPERATURE: 97.4 F | HEART RATE: 64 BPM

## 2025-05-01 DIAGNOSIS — F41.8 ANXIETY WITH DEPRESSION: ICD-10-CM

## 2025-05-01 DIAGNOSIS — G47.00 INSOMNIA, UNSPECIFIED TYPE: Primary | ICD-10-CM

## 2025-05-01 PROCEDURE — 99213 OFFICE O/P EST LOW 20 MIN: CPT | Performed by: NURSE PRACTITIONER

## 2025-05-01 RX ORDER — TRAZODONE HYDROCHLORIDE 50 MG/1
50 TABLET ORAL
COMMUNITY
Start: 2025-04-17 | End: 2025-05-01 | Stop reason: SDUPTHER

## 2025-05-01 RX ORDER — TRAZODONE HYDROCHLORIDE 50 MG/1
50 TABLET ORAL NIGHTLY
Qty: 90 TABLET | Refills: 0 | Status: SHIPPED | OUTPATIENT
Start: 2025-05-01 | End: 2025-07-30

## 2025-05-01 NOTE — PROGRESS NOTES
Chief Complaint  Hypothyroidism (Follow up - discuss referral ), Anxiety, and Depression    Subjective          Alexus Fuentes presents to [facility]     Patient is a 39-year-old female who is here today to follow-up regarding depression.  She feels as if previous diagnosis of bipolar disorder after hospitalization in 2017 is an accurate.  She denies mood swings and feels very confident with knowledge of diagnostic criteria that she does not meet a bipolar diagnosis.  In 2017 they started her on olanzapine and lithium which were not beneficial.  She is also previously taking Wellbutrin, venlafaxine, Zoloft, Lexapro, and Abilify.  She has done best on fluoxetine and is currently on 60 mg/day.  She recently had an episode where she did not sleep for a few days.  This was similar to symptoms that she encountered in 2017 prior to being hospitalized.  On April 17 she went to [facility] emergency room for evaluation.  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  Received a prescription for trazodone 50 mg tablet which she takes a half a tablet at bedtime and will only take the other half tablet a few hours later if needed.  She denies medication side effects and request refills.  Patient states she was not aware that her boyfriend had been calling this office to request a psychiatric referral for patient.  The psychiatric referral that I ordered would result in her being seen in [place] and she would rather be seen in [place].  She request that referral be changed for her to see Jen Trotter.  Patient did complete group therapy in 2017 but no therapy since that time.  She feels as if fatigue is a primary symptom due to depression.  Patient denies any concerns for her safety.  Is under increased stress.     Objective   Vital Signs:   Vitals:    05/01/25 0829   BP: 118/72   BP Location: Left arm   Patient Position: Sitting   Cuff Size: Adult   Pulse: 64   Temp: 97.4 °F (36.3 °C)   TempSrc:  "Temporal   SpO2: 100%   Weight: 59.9 kg (132 lb)   Height: 166.4 cm (65.51\")       Wt Readings from Last 3 Encounters:   05/01/25 59.9 kg (132 lb)   01/23/25 65.8 kg (145 lb)   04/09/24 64.4 kg (142 lb)      BP Readings from Last 3 Encounters:   05/01/25 118/72   01/23/25 114/73   04/09/24 107/73       Body mass index is 21.63 kg/m².    BMI is within normal parameters. No other follow-up for BMI required.       Physical Exam  Vitals reviewed.   Constitutional:       General: She is not in acute distress.     Appearance: Normal appearance. She is well-developed.   Cardiovascular:      Rate and Rhythm: Normal rate and regular rhythm.      Heart sounds: Normal heart sounds.   Pulmonary:      Effort: Pulmonary effort is normal.      Breath sounds: Normal breath sounds.   Musculoskeletal:      Right lower leg: No edema.      Left lower leg: No edema.   Skin:     General: Skin is warm and dry.   Neurological:      General: No focal deficit present.      Mental Status: She is alert.   Psychiatric:         Attention and Perception: Attention normal.         Mood and Affect: Mood and affect normal.         Behavior: Behavior normal.         Judgment: Judgment normal.           Current Outpatient Medications:     FLUoxetine (PROzac) 20 MG capsule, Take 3 capsules by mouth Daily., Disp: 270 capsule, Rfl: 1    levothyroxine (SYNTHROID, LEVOTHROID) 75 MCG tablet, Take 1 tablet by mouth Daily., Disp: 90 tablet, Rfl: 1    traZODone (DESYREL) 50 MG tablet, Take 1 tablet by mouth Every Night for 90 days., Disp: 90 tablet, Rfl: 0   Past Medical History:   Diagnosis Date    Acute pyelonephritis 07/27/2017    Depression     Hypothyroidism     UTI (urinary tract infection)      Allergies   Allergen Reactions    Wellbutrin [Bupropion] Other (See Comments)     Suicidal ideation               Result Review :     Common labs          1/23/2025    11:50   Common Labs   Glucose 85    BUN 15    Creatinine 0.95    Sodium 136    Potassium 4.3  "   Chloride 101    Calcium 8.8    Albumin 3.5    Total Bilirubin 0.3    Alkaline Phosphatase 43    AST (SGOT) 15    ALT (SGPT) 11    Total Cholesterol 151    Triglycerides 72    HDL Cholesterol 56    LDL Cholesterol  81         No Images in the past 120 days found..           Social History     Tobacco Use   Smoking Status Never   Smokeless Tobacco Never           Assessment and Plan    Diagnoses and all orders for this visit:    1. Insomnia, unspecified type (Primary)  -     traZODone (DESYREL) 50 MG tablet; Take 1 tablet by mouth Every Night for 90 days.  Dispense: 90 tablet; Refill: 0    2. Anxiety with depression  Assessment & Plan:  Patient is advised that referrals had repeatedly tried to contact her regarding referral.  Patient states that she instead wants to see Jen Trotter instead of a provider in [place].  I offered to have patient talk to referrals today but states she was told she could not see Jen until September.  She is in hopes that maybe a sooner appointment could be scheduled.  I will reach out to Jen Trotter for further guidance or recommendations.          Follow Up    No follow-ups on file.  Patient was given instructions and counseling regarding her condition or for health maintenance advice. Please see specific information pulled into the AVS if appropriate.

## 2025-05-01 NOTE — ASSESSMENT & PLAN NOTE
Patient is advised that referrals had repeatedly tried to contact her regarding referral.  Patient states that she instead wants to see Jen Trotter instead of a provider in [place].  I offered to have patient talk to referrals today but states she was told she could not see Jen until September.  She is in hopes that maybe a sooner appointment could be scheduled.  I will reach out to Jen Trotter for further guidance or recommendations.

## 2025-05-01 NOTE — Clinical Note
Would you be able to see this patient please?  She has pending referral for psychiatry but wants to be seen in [place].

## 2025-07-24 ENCOUNTER — OFFICE VISIT (OUTPATIENT)
Dept: FAMILY MEDICINE CLINIC | Age: 40
End: 2025-07-24
Payer: COMMERCIAL

## 2025-07-24 VITALS
SYSTOLIC BLOOD PRESSURE: 114 MMHG | DIASTOLIC BLOOD PRESSURE: 73 MMHG | WEIGHT: 124 LBS | BODY MASS INDEX: 19.93 KG/M2 | HEIGHT: 66 IN | TEMPERATURE: 98.3 F | OXYGEN SATURATION: 98 % | HEART RATE: 67 BPM

## 2025-07-24 DIAGNOSIS — E03.9 ACQUIRED HYPOTHYROIDISM: ICD-10-CM

## 2025-07-24 DIAGNOSIS — M54.2 NECK PAIN: ICD-10-CM

## 2025-07-24 DIAGNOSIS — F32.A DEPRESSION, UNSPECIFIED DEPRESSION TYPE: ICD-10-CM

## 2025-07-24 DIAGNOSIS — G47.00 INSOMNIA, UNSPECIFIED TYPE: ICD-10-CM

## 2025-07-24 DIAGNOSIS — M25.512 ACUTE PAIN OF LEFT SHOULDER: Primary | ICD-10-CM

## 2025-07-24 PROCEDURE — 99213 OFFICE O/P EST LOW 20 MIN: CPT | Performed by: NURSE PRACTITIONER

## 2025-07-24 RX ORDER — BACLOFEN 10 MG/1
10 TABLET ORAL 2 TIMES DAILY PRN
Qty: 30 TABLET | Refills: 1 | Status: SHIPPED | OUTPATIENT
Start: 2025-07-24

## 2025-07-24 RX ORDER — LEVOTHYROXINE SODIUM 75 UG/1
75 TABLET ORAL DAILY
Qty: 90 TABLET | Refills: 1 | Status: SHIPPED | OUTPATIENT
Start: 2025-07-24

## 2025-07-24 RX ORDER — TRAZODONE HYDROCHLORIDE 50 MG/1
TABLET ORAL
Qty: 45 TABLET | Refills: 1 | Status: SHIPPED | OUTPATIENT
Start: 2025-07-24

## 2025-07-24 NOTE — PROGRESS NOTES
"Chief Complaint  Hypothyroidism (6 month follow up ), Anxiety, Depression, Insomnia, and Shoulder Pain (Left shoulder pain for 3 days )    Subjective          Alexus Fuentes presents to [facility]     Patient is 39-year-old female who is here today to follow-up regarding hypothyroidism.  Current treatment is levothyroxine 75 mcg daily.  Denies side effects and requests refills.  Her last TSH level in January was 3.04.  Patient prefers to have this checked only once per year.    Last menstrual cycle July 19.  Patient has not received a tetanus booster within the last 10 years.  Patient may receive tetanus booster here or at her local pharmacy at the time of her choosing.  Patient defers tetanus booster today.    Is reporting left shoulder pain at rest and with certain movements for the last 3 days.  She denies any fall or injury.  She also reports some left sided neck pain with rotation to the right.  Has tried heating pad, Tylenol, ibuprofen, Aleve with some benefit.  Pain is 8 out of 10 today.    Insomnia has been improved on trazodone 50 mg at half tablet at bedtime as needed.  Does not take every night.  Sleep overall has been good except for the last 2 nights and that is due to her neck and shoulder discomfort.  She defers previously requested referral to [facility] and does not feel as if it is necessary at this time.  Defers therapist at this time.  She feels as if symptoms are improved on fluoxetine 40 mg/day.  Denies side effects and requests refills.  She feels as if on diagnosis of bipolar disorder as a teen was inaccurate and she denies mood swings.     Objective   Vital Signs:   Vitals:    07/24/25 1027   BP: 114/73   BP Location: Left arm   Patient Position: Sitting   Cuff Size: Adult   Pulse: 67   Temp: 98.3 °F (36.8 °C)   TempSrc: Oral   SpO2: 98%   Weight: 56.2 kg (124 lb)   Height: 166.4 cm (65.51\")       Wt Readings from Last 3 Encounters:   07/24/25 56.2 kg (124 " lb)   05/01/25 59.9 kg (132 lb)   01/23/25 65.8 kg (145 lb)      BP Readings from Last 3 Encounters:   07/24/25 114/73   05/01/25 118/72   01/23/25 114/73       Body mass index is 20.31 kg/m².    BMI is within normal parameters. No other follow-up for BMI required.       Physical Exam  Vitals reviewed.   Constitutional:       General: She is not in acute distress.     Appearance: Normal appearance. She is well-developed.   Cardiovascular:      Rate and Rhythm: Normal rate and regular rhythm.      Pulses:           Radial pulses are 2+ on the left side.      Heart sounds: Normal heart sounds.   Pulmonary:      Effort: Pulmonary effort is normal.      Breath sounds: Normal breath sounds.   Musculoskeletal:      Right lower leg: No edema.      Left lower leg: No edema.      Comments: Pain of left neck and left supraspinatus when she reaches her left arm behind her back only.  Left sided neck pain with rotation of her head to the right.   Skin:     General: Skin is warm and dry.   Neurological:      General: No focal deficit present.      Mental Status: She is alert.   Psychiatric:         Attention and Perception: Attention normal.         Mood and Affect: Mood and affect normal.         Behavior: Behavior normal.           Current Outpatient Medications:     levothyroxine (SYNTHROID, LEVOTHROID) 75 MCG tablet, Take 1 tablet by mouth Daily., Disp: 90 tablet, Rfl: 1    traZODone (DESYREL) 50 MG tablet, Take one half tablet at bedtime, Disp: 45 tablet, Rfl: 1    baclofen (LIORESAL) 10 MG tablet, Take 1 tablet by mouth 2 (Two) Times a Day As Needed for Muscle Spasms. May cause drowsiness, Disp: 30 tablet, Rfl: 1    diclofenac (VOLTAREN) 50 MG EC tablet, Take 1 tablet by mouth 2 (Two) Times a Day As Needed (pain). Take with food, Disp: 40 tablet, Rfl: 1    FLUoxetine (PROzac) 20 MG capsule, Take 2 capsules by mouth Daily., Disp: 180 capsule, Rfl: 1   Past Medical History:   Diagnosis Date    Acute pyelonephritis 07/27/2017     Depression     Hypothyroidism     UTI (urinary tract infection)      Allergies   Allergen Reactions    Wellbutrin [Bupropion] Other (See Comments)     Suicidal ideation               Result Review :     Common labs          1/23/2025    11:50   Common Labs   Glucose 85    BUN 15    Creatinine 0.95    Sodium 136    Potassium 4.3    Chloride 101    Calcium 8.8    Albumin 3.5    Total Bilirubin 0.3    Alkaline Phosphatase 43    AST (SGOT) 15    ALT (SGPT) 11    Total Cholesterol 151    Triglycerides 72    HDL Cholesterol 56    LDL Cholesterol  81         No Images in the past 120 days found..           Social History     Tobacco Use   Smoking Status Never   Smokeless Tobacco Never           Assessment and Plan    Diagnoses and all orders for this visit:    1. Acute pain of left shoulder (Primary)  Assessment & Plan:  Defers x-ray of neck or left shoulder at this time.  Will continue conservative measures and we will start diclofenac twice daily as needed with food.  For neck pain she may take baclofen as needed as a muscle relaxer but it may cause drowsiness.  I recommend x-rays and physical therapy if symptoms are not improving.    Orders:  -     diclofenac (VOLTAREN) 50 MG EC tablet; Take 1 tablet by mouth 2 (Two) Times a Day As Needed (pain). Take with food  Dispense: 40 tablet; Refill: 1    2. Neck pain  -     diclofenac (VOLTAREN) 50 MG EC tablet; Take 1 tablet by mouth 2 (Two) Times a Day As Needed (pain). Take with food  Dispense: 40 tablet; Refill: 1  -     baclofen (LIORESAL) 10 MG tablet; Take 1 tablet by mouth 2 (Two) Times a Day As Needed for Muscle Spasms. May cause drowsiness  Dispense: 30 tablet; Refill: 1    3. Acquired hypothyroidism  -     levothyroxine (SYNTHROID, LEVOTHROID) 75 MCG tablet; Take 1 tablet by mouth Daily.  Dispense: 90 tablet; Refill: 1    4. Insomnia, unspecified type  -     traZODone (DESYREL) 50 MG tablet; Take one half tablet at bedtime  Dispense: 45 tablet; Refill: 1    5.  Depression, unspecified depression type  -     FLUoxetine (PROzac) 20 MG capsule; Take 2 capsules by mouth Daily.  Dispense: 180 capsule; Refill: 1        Follow Up    Return in about 6 months (around 1/24/2026).  Patient was given instructions and counseling regarding her condition or for health maintenance advice. Please see specific information pulled into the AVS if appropriate.

## 2025-07-24 NOTE — ASSESSMENT & PLAN NOTE
Defers x-ray of neck or left shoulder at this time.  Will continue conservative measures and we will start diclofenac twice daily as needed with food.  For neck pain she may take baclofen as needed as a muscle relaxer but it may cause drowsiness.  I recommend x-rays and physical therapy if symptoms are not improving.